# Patient Record
Sex: FEMALE | Race: WHITE | Employment: UNEMPLOYED | URBAN - METROPOLITAN AREA
[De-identification: names, ages, dates, MRNs, and addresses within clinical notes are randomized per-mention and may not be internally consistent; named-entity substitution may affect disease eponyms.]

---

## 2018-01-04 PROBLEM — Z86.73 HISTORY OF CVA (CEREBROVASCULAR ACCIDENT): Status: ACTIVE | Noted: 2018-01-04

## 2018-01-04 PROBLEM — R06.02 SOB (SHORTNESS OF BREATH): Status: ACTIVE | Noted: 2018-01-04

## 2018-01-04 PROBLEM — E11.65 TYPE II OR UNSPECIFIED TYPE DIABETES MELLITUS WITH RENAL MANIFESTATIONS, UNCONTROLLED(250.42) (HCC): Status: ACTIVE | Noted: 2018-01-04

## 2018-01-04 PROBLEM — R94.31 ABNORMAL EKG: Status: ACTIVE | Noted: 2018-01-04

## 2018-01-04 PROBLEM — E11.29 TYPE II OR UNSPECIFIED TYPE DIABETES MELLITUS WITH RENAL MANIFESTATIONS, UNCONTROLLED(250.42) (HCC): Status: ACTIVE | Noted: 2018-01-04

## 2018-01-04 PROBLEM — G40.909 SEIZURE DISORDER (HCC): Status: ACTIVE | Noted: 2018-01-04

## 2018-11-26 PROBLEM — G40.909 SEIZURE DISORDER (HCC): Status: RESOLVED | Noted: 2018-01-04 | Resolved: 2018-11-26

## 2021-12-28 PROBLEM — G30.9 ALZHEIMER'S DISEASE, UNSPECIFIED (CODE) (HCC): Status: ACTIVE | Noted: 2021-12-28

## 2021-12-29 ENCOUNTER — HOME HEALTH ADMISSION (OUTPATIENT)
Dept: HOME HEALTH SERVICES | Facility: HOME HEALTH | Age: 71
End: 2021-12-29

## 2022-01-12 ENCOUNTER — HOME HEALTH ADMISSION (OUTPATIENT)
Dept: HOME HEALTH SERVICES | Facility: HOME HEALTH | Age: 72
End: 2022-01-12
Payer: MEDICARE

## 2022-01-21 ENCOUNTER — HOME CARE VISIT (OUTPATIENT)
Dept: SCHEDULING | Facility: HOME HEALTH | Age: 72
End: 2022-01-21
Payer: MEDICARE

## 2022-01-21 VITALS
WEIGHT: 165 LBS | OXYGEN SATURATION: 98 % | DIASTOLIC BLOOD PRESSURE: 80 MMHG | RESPIRATION RATE: 17 BRPM | TEMPERATURE: 97.5 F | SYSTOLIC BLOOD PRESSURE: 132 MMHG | HEART RATE: 92 BPM | HEIGHT: 62 IN | BODY MASS INDEX: 30.36 KG/M2

## 2022-01-21 PROCEDURE — 400018 HH-NO PAY CLAIM PROCEDURE

## 2022-01-21 PROCEDURE — 400013 HH SOC

## 2022-01-21 PROCEDURE — G0299 HHS/HOSPICE OF RN EA 15 MIN: HCPCS

## 2022-01-21 PROCEDURE — 3331090002 HH PPS REVENUE DEBIT

## 2022-01-21 PROCEDURE — 3331090001 HH PPS REVENUE CREDIT

## 2022-01-22 PROCEDURE — 3331090002 HH PPS REVENUE DEBIT

## 2022-01-22 PROCEDURE — 3331090001 HH PPS REVENUE CREDIT

## 2022-01-23 PROCEDURE — 3331090001 HH PPS REVENUE CREDIT

## 2022-01-23 PROCEDURE — 3331090002 HH PPS REVENUE DEBIT

## 2022-01-24 PROCEDURE — 3331090001 HH PPS REVENUE CREDIT

## 2022-01-24 PROCEDURE — 3331090002 HH PPS REVENUE DEBIT

## 2022-01-25 ENCOUNTER — HOME CARE VISIT (OUTPATIENT)
Dept: SCHEDULING | Facility: HOME HEALTH | Age: 72
End: 2022-01-25
Payer: MEDICARE

## 2022-01-25 VITALS
SYSTOLIC BLOOD PRESSURE: 116 MMHG | DIASTOLIC BLOOD PRESSURE: 72 MMHG | OXYGEN SATURATION: 98 % | TEMPERATURE: 98 F | RESPIRATION RATE: 17 BRPM | HEART RATE: 110 BPM

## 2022-01-25 PROCEDURE — G0299 HHS/HOSPICE OF RN EA 15 MIN: HCPCS

## 2022-01-25 PROCEDURE — 3331090001 HH PPS REVENUE CREDIT

## 2022-01-25 PROCEDURE — 3331090002 HH PPS REVENUE DEBIT

## 2022-01-26 PROCEDURE — 3331090001 HH PPS REVENUE CREDIT

## 2022-01-26 PROCEDURE — 3331090002 HH PPS REVENUE DEBIT

## 2022-01-27 PROCEDURE — 3331090001 HH PPS REVENUE CREDIT

## 2022-01-27 PROCEDURE — 3331090002 HH PPS REVENUE DEBIT

## 2022-01-28 ENCOUNTER — HOME CARE VISIT (OUTPATIENT)
Dept: SCHEDULING | Facility: HOME HEALTH | Age: 72
End: 2022-01-28
Payer: MEDICARE

## 2022-01-28 VITALS
RESPIRATION RATE: 17 BRPM | DIASTOLIC BLOOD PRESSURE: 70 MMHG | OXYGEN SATURATION: 98 % | HEART RATE: 100 BPM | TEMPERATURE: 98.4 F | SYSTOLIC BLOOD PRESSURE: 110 MMHG

## 2022-01-28 PROCEDURE — G0299 HHS/HOSPICE OF RN EA 15 MIN: HCPCS

## 2022-01-28 PROCEDURE — 3331090001 HH PPS REVENUE CREDIT

## 2022-01-28 PROCEDURE — 3331090002 HH PPS REVENUE DEBIT

## 2022-01-29 PROCEDURE — 3331090001 HH PPS REVENUE CREDIT

## 2022-01-29 PROCEDURE — 3331090002 HH PPS REVENUE DEBIT

## 2022-01-30 PROCEDURE — 3331090002 HH PPS REVENUE DEBIT

## 2022-01-30 PROCEDURE — 3331090001 HH PPS REVENUE CREDIT

## 2022-01-31 PROCEDURE — 3331090002 HH PPS REVENUE DEBIT

## 2022-01-31 PROCEDURE — 3331090001 HH PPS REVENUE CREDIT

## 2022-02-01 ENCOUNTER — HOME CARE VISIT (OUTPATIENT)
Dept: SCHEDULING | Facility: HOME HEALTH | Age: 72
End: 2022-02-01
Payer: MEDICARE

## 2022-02-01 VITALS
OXYGEN SATURATION: 98 % | SYSTOLIC BLOOD PRESSURE: 118 MMHG | DIASTOLIC BLOOD PRESSURE: 76 MMHG | HEART RATE: 90 BPM | TEMPERATURE: 98.1 F | RESPIRATION RATE: 17 BRPM

## 2022-02-01 PROCEDURE — G0299 HHS/HOSPICE OF RN EA 15 MIN: HCPCS

## 2022-02-01 PROCEDURE — 3331090002 HH PPS REVENUE DEBIT

## 2022-02-01 PROCEDURE — 3331090001 HH PPS REVENUE CREDIT

## 2022-02-02 ENCOUNTER — HOME CARE VISIT (OUTPATIENT)
Dept: HOME HEALTH SERVICES | Facility: HOME HEALTH | Age: 72
End: 2022-02-02
Payer: MEDICARE

## 2022-02-02 PROCEDURE — 3331090002 HH PPS REVENUE DEBIT

## 2022-02-02 PROCEDURE — 3331090001 HH PPS REVENUE CREDIT

## 2022-02-03 PROCEDURE — 3331090001 HH PPS REVENUE CREDIT

## 2022-02-03 PROCEDURE — 3331090002 HH PPS REVENUE DEBIT

## 2022-02-04 ENCOUNTER — HOME CARE VISIT (OUTPATIENT)
Dept: SCHEDULING | Facility: HOME HEALTH | Age: 72
End: 2022-02-04
Payer: MEDICARE

## 2022-02-04 VITALS
HEART RATE: 88 BPM | OXYGEN SATURATION: 98 % | RESPIRATION RATE: 19 BRPM | SYSTOLIC BLOOD PRESSURE: 122 MMHG | TEMPERATURE: 97.8 F | DIASTOLIC BLOOD PRESSURE: 64 MMHG

## 2022-02-04 PROCEDURE — G0299 HHS/HOSPICE OF RN EA 15 MIN: HCPCS

## 2022-02-04 PROCEDURE — 3331090001 HH PPS REVENUE CREDIT

## 2022-02-04 PROCEDURE — 3331090002 HH PPS REVENUE DEBIT

## 2022-02-05 PROCEDURE — 3331090002 HH PPS REVENUE DEBIT

## 2022-02-05 PROCEDURE — 3331090001 HH PPS REVENUE CREDIT

## 2022-02-06 PROCEDURE — 3331090002 HH PPS REVENUE DEBIT

## 2022-02-06 PROCEDURE — 3331090001 HH PPS REVENUE CREDIT

## 2022-02-07 PROCEDURE — 3331090002 HH PPS REVENUE DEBIT

## 2022-02-07 PROCEDURE — 3331090001 HH PPS REVENUE CREDIT

## 2022-02-08 PROCEDURE — 3331090002 HH PPS REVENUE DEBIT

## 2022-02-08 PROCEDURE — 3331090001 HH PPS REVENUE CREDIT

## 2022-02-09 ENCOUNTER — HOME CARE VISIT (OUTPATIENT)
Dept: SCHEDULING | Facility: HOME HEALTH | Age: 72
End: 2022-02-09
Payer: MEDICARE

## 2022-02-09 VITALS
DIASTOLIC BLOOD PRESSURE: 72 MMHG | TEMPERATURE: 45.3 F | RESPIRATION RATE: 17 BRPM | SYSTOLIC BLOOD PRESSURE: 118 MMHG | OXYGEN SATURATION: 98 %

## 2022-02-09 PROBLEM — G62.9 NEUROPATHY: Status: ACTIVE | Noted: 2022-02-09

## 2022-02-09 PROBLEM — Z79.4 TYPE 2 DIABETES MELLITUS WITH HYPERGLYCEMIA, WITH LONG-TERM CURRENT USE OF INSULIN (HCC): Status: ACTIVE | Noted: 2022-02-09

## 2022-02-09 PROBLEM — E78.00 HYPERCHOLESTEROLEMIA: Status: ACTIVE | Noted: 2022-02-09

## 2022-02-09 PROBLEM — H35.00 RETINOPATHY: Status: ACTIVE | Noted: 2022-02-09

## 2022-02-09 PROBLEM — R41.3 MEMORY LOSS: Status: ACTIVE | Noted: 2022-02-09

## 2022-02-09 PROBLEM — R80.9 MICROALBUMINURIA: Status: ACTIVE | Noted: 2022-02-09

## 2022-02-09 PROBLEM — E11.65 TYPE 2 DIABETES MELLITUS WITH HYPERGLYCEMIA, WITH LONG-TERM CURRENT USE OF INSULIN (HCC): Status: ACTIVE | Noted: 2022-02-09

## 2022-02-09 PROCEDURE — 3331090001 HH PPS REVENUE CREDIT

## 2022-02-09 PROCEDURE — G0299 HHS/HOSPICE OF RN EA 15 MIN: HCPCS

## 2022-02-09 PROCEDURE — 3331090002 HH PPS REVENUE DEBIT

## 2022-02-10 ENCOUNTER — TELEPHONE (OUTPATIENT)
Dept: DIABETES SERVICES | Age: 72
End: 2022-02-10

## 2022-02-10 ENCOUNTER — HOME CARE VISIT (OUTPATIENT)
Dept: SCHEDULING | Facility: HOME HEALTH | Age: 72
End: 2022-02-10
Payer: MEDICARE

## 2022-02-10 VITALS
RESPIRATION RATE: 17 BRPM | OXYGEN SATURATION: 99 % | DIASTOLIC BLOOD PRESSURE: 70 MMHG | TEMPERATURE: 97.7 F | HEART RATE: 74 BPM | SYSTOLIC BLOOD PRESSURE: 124 MMHG

## 2022-02-10 PROCEDURE — 3331090001 HH PPS REVENUE CREDIT

## 2022-02-10 PROCEDURE — 3331090002 HH PPS REVENUE DEBIT

## 2022-02-10 PROCEDURE — G0299 HHS/HOSPICE OF RN EA 15 MIN: HCPCS

## 2022-02-10 NOTE — TELEPHONE ENCOUNTER
Call to patient about Diabetes education. Talked with  Flores Horn. She has never had education on Medicare and is interested. Instructed [place] Medicare pays 80%. He reports she has a secondary. Instructed secondary hopefully will  the rest.  Instructed to bring secondary card with them to appointment as do not have it in her chart. Called [employer]. Scheduled for 3- at 8 AM.  He reports she is not compliant with her diet and she needs the help.

## 2022-02-11 PROCEDURE — 3331090001 HH PPS REVENUE CREDIT

## 2022-02-11 PROCEDURE — 3331090002 HH PPS REVENUE DEBIT

## 2022-02-12 PROCEDURE — 3331090001 HH PPS REVENUE CREDIT

## 2022-02-12 PROCEDURE — 3331090002 HH PPS REVENUE DEBIT

## 2022-02-13 PROCEDURE — 3331090002 HH PPS REVENUE DEBIT

## 2022-02-13 PROCEDURE — 3331090001 HH PPS REVENUE CREDIT

## 2022-02-14 PROCEDURE — 3331090002 HH PPS REVENUE DEBIT

## 2022-02-14 PROCEDURE — 3331090001 HH PPS REVENUE CREDIT

## 2022-02-15 ENCOUNTER — HOME CARE VISIT (OUTPATIENT)
Dept: SCHEDULING | Facility: HOME HEALTH | Age: 72
End: 2022-02-15
Payer: MEDICARE

## 2022-02-15 VITALS
TEMPERATURE: 97.8 F | HEART RATE: 88 BPM | DIASTOLIC BLOOD PRESSURE: 94 MMHG | SYSTOLIC BLOOD PRESSURE: 132 MMHG | RESPIRATION RATE: 17 BRPM | OXYGEN SATURATION: 98 %

## 2022-02-15 PROCEDURE — G0299 HHS/HOSPICE OF RN EA 15 MIN: HCPCS

## 2022-02-15 PROCEDURE — 3331090001 HH PPS REVENUE CREDIT

## 2022-02-15 PROCEDURE — 3331090002 HH PPS REVENUE DEBIT

## 2022-02-16 PROCEDURE — 3331090001 HH PPS REVENUE CREDIT

## 2022-02-16 PROCEDURE — 3331090002 HH PPS REVENUE DEBIT

## 2022-02-17 PROCEDURE — 3331090001 HH PPS REVENUE CREDIT

## 2022-02-17 PROCEDURE — 3331090002 HH PPS REVENUE DEBIT

## 2022-02-18 PROCEDURE — 3331090002 HH PPS REVENUE DEBIT

## 2022-02-18 PROCEDURE — 3331090001 HH PPS REVENUE CREDIT

## 2022-02-19 PROCEDURE — 3331090001 HH PPS REVENUE CREDIT

## 2022-02-19 PROCEDURE — 3331090002 HH PPS REVENUE DEBIT

## 2022-02-20 PROCEDURE — 3331090002 HH PPS REVENUE DEBIT

## 2022-02-20 PROCEDURE — 3331090001 HH PPS REVENUE CREDIT

## 2022-02-21 PROCEDURE — 3331090001 HH PPS REVENUE CREDIT

## 2022-02-21 PROCEDURE — 3331090002 HH PPS REVENUE DEBIT

## 2022-02-22 PROCEDURE — 3331090002 HH PPS REVENUE DEBIT

## 2022-02-22 PROCEDURE — 3331090001 HH PPS REVENUE CREDIT

## 2022-02-23 PROCEDURE — 3331090001 HH PPS REVENUE CREDIT

## 2022-02-23 PROCEDURE — 3331090002 HH PPS REVENUE DEBIT

## 2022-02-24 ENCOUNTER — HOME CARE VISIT (OUTPATIENT)
Dept: SCHEDULING | Facility: HOME HEALTH | Age: 72
End: 2022-02-24
Payer: MEDICARE

## 2022-02-24 VITALS
OXYGEN SATURATION: 98 % | DIASTOLIC BLOOD PRESSURE: 74 MMHG | RESPIRATION RATE: 17 BRPM | SYSTOLIC BLOOD PRESSURE: 126 MMHG | TEMPERATURE: 97.3 F | HEART RATE: 94 BPM

## 2022-02-24 PROCEDURE — 400013 HH SOC

## 2022-02-24 PROCEDURE — 3331090002 HH PPS REVENUE DEBIT

## 2022-02-24 PROCEDURE — 3331090001 HH PPS REVENUE CREDIT

## 2022-02-24 PROCEDURE — G0299 HHS/HOSPICE OF RN EA 15 MIN: HCPCS

## 2022-02-25 PROCEDURE — 3331090001 HH PPS REVENUE CREDIT

## 2022-02-25 PROCEDURE — 3331090002 HH PPS REVENUE DEBIT

## 2022-02-26 PROCEDURE — 3331090001 HH PPS REVENUE CREDIT

## 2022-02-26 PROCEDURE — 3331090002 HH PPS REVENUE DEBIT

## 2022-02-27 PROCEDURE — 3331090002 HH PPS REVENUE DEBIT

## 2022-02-27 PROCEDURE — 3331090001 HH PPS REVENUE CREDIT

## 2022-02-28 PROCEDURE — 3331090002 HH PPS REVENUE DEBIT

## 2022-02-28 PROCEDURE — 3331090001 HH PPS REVENUE CREDIT

## 2022-03-01 ENCOUNTER — HOME CARE VISIT (OUTPATIENT)
Dept: SCHEDULING | Facility: HOME HEALTH | Age: 72
End: 2022-03-01
Payer: MEDICARE

## 2022-03-01 VITALS
OXYGEN SATURATION: 98 % | HEART RATE: 100 BPM | TEMPERATURE: 98.3 F | RESPIRATION RATE: 17 BRPM | SYSTOLIC BLOOD PRESSURE: 128 MMHG | DIASTOLIC BLOOD PRESSURE: 72 MMHG

## 2022-03-01 PROCEDURE — G0299 HHS/HOSPICE OF RN EA 15 MIN: HCPCS

## 2022-03-01 PROCEDURE — 3331090001 HH PPS REVENUE CREDIT

## 2022-03-01 PROCEDURE — 3331090002 HH PPS REVENUE DEBIT

## 2022-03-02 PROCEDURE — 3331090001 HH PPS REVENUE CREDIT

## 2022-03-02 PROCEDURE — 3331090002 HH PPS REVENUE DEBIT

## 2022-03-03 PROCEDURE — 3331090002 HH PPS REVENUE DEBIT

## 2022-03-03 PROCEDURE — 3331090001 HH PPS REVENUE CREDIT

## 2022-03-04 PROCEDURE — 3331090001 HH PPS REVENUE CREDIT

## 2022-03-04 PROCEDURE — 3331090002 HH PPS REVENUE DEBIT

## 2022-03-05 PROCEDURE — 3331090001 HH PPS REVENUE CREDIT

## 2022-03-05 PROCEDURE — 3331090002 HH PPS REVENUE DEBIT

## 2022-03-06 PROCEDURE — 3331090001 HH PPS REVENUE CREDIT

## 2022-03-06 PROCEDURE — 3331090002 HH PPS REVENUE DEBIT

## 2022-03-07 PROCEDURE — 3331090002 HH PPS REVENUE DEBIT

## 2022-03-07 PROCEDURE — 3331090001 HH PPS REVENUE CREDIT

## 2022-03-08 PROCEDURE — 3331090002 HH PPS REVENUE DEBIT

## 2022-03-08 PROCEDURE — 3331090001 HH PPS REVENUE CREDIT

## 2022-03-09 ENCOUNTER — HOME CARE VISIT (OUTPATIENT)
Dept: SCHEDULING | Facility: HOME HEALTH | Age: 72
End: 2022-03-09
Payer: MEDICARE

## 2022-03-09 VITALS
DIASTOLIC BLOOD PRESSURE: 70 MMHG | OXYGEN SATURATION: 99 % | HEART RATE: 98 BPM | SYSTOLIC BLOOD PRESSURE: 126 MMHG | TEMPERATURE: 97.7 F | RESPIRATION RATE: 17 BRPM

## 2022-03-09 PROCEDURE — 3331090002 HH PPS REVENUE DEBIT

## 2022-03-09 PROCEDURE — 3331090001 HH PPS REVENUE CREDIT

## 2022-03-09 PROCEDURE — G0299 HHS/HOSPICE OF RN EA 15 MIN: HCPCS

## 2022-03-10 PROCEDURE — 3331090001 HH PPS REVENUE CREDIT

## 2022-03-10 PROCEDURE — 3331090002 HH PPS REVENUE DEBIT

## 2022-03-11 PROCEDURE — 3331090002 HH PPS REVENUE DEBIT

## 2022-03-11 PROCEDURE — 3331090001 HH PPS REVENUE CREDIT

## 2022-03-12 PROCEDURE — 3331090001 HH PPS REVENUE CREDIT

## 2022-03-12 PROCEDURE — 3331090002 HH PPS REVENUE DEBIT

## 2022-03-13 PROCEDURE — 3331090002 HH PPS REVENUE DEBIT

## 2022-03-13 PROCEDURE — 3331090001 HH PPS REVENUE CREDIT

## 2022-03-14 PROCEDURE — 3331090001 HH PPS REVENUE CREDIT

## 2022-03-14 PROCEDURE — 3331090002 HH PPS REVENUE DEBIT

## 2022-03-15 ENCOUNTER — TELEPHONE (OUTPATIENT)
Dept: DIABETES SERVICES | Age: 72
End: 2022-03-15

## 2022-03-15 PROCEDURE — 3331090001 HH PPS REVENUE CREDIT

## 2022-03-15 PROCEDURE — 3331090002 HH PPS REVENUE DEBIT

## 2022-03-15 NOTE — TELEPHONE ENCOUNTER
Patient no show for Diabetes assessment today. Called and left message to reschedule. Number provided.

## 2022-03-16 PROCEDURE — 3331090002 HH PPS REVENUE DEBIT

## 2022-03-16 PROCEDURE — 3331090001 HH PPS REVENUE CREDIT

## 2022-03-17 ENCOUNTER — HOME CARE VISIT (OUTPATIENT)
Dept: SCHEDULING | Facility: HOME HEALTH | Age: 72
End: 2022-03-17
Payer: MEDICARE

## 2022-03-17 VITALS
TEMPERATURE: 97.7 F | OXYGEN SATURATION: 98 % | SYSTOLIC BLOOD PRESSURE: 126 MMHG | HEART RATE: 86 BPM | RESPIRATION RATE: 17 BRPM | DIASTOLIC BLOOD PRESSURE: 70 MMHG

## 2022-03-17 PROCEDURE — G0299 HHS/HOSPICE OF RN EA 15 MIN: HCPCS

## 2022-03-17 PROCEDURE — 3331090002 HH PPS REVENUE DEBIT

## 2022-03-17 PROCEDURE — 3331090001 HH PPS REVENUE CREDIT

## 2022-03-18 PROBLEM — Z79.4 TYPE 2 DIABETES MELLITUS WITH HYPERGLYCEMIA, WITH LONG-TERM CURRENT USE OF INSULIN (HCC): Status: ACTIVE | Noted: 2022-02-09

## 2022-03-18 PROBLEM — E11.65 TYPE 2 DIABETES MELLITUS WITH HYPERGLYCEMIA, WITH LONG-TERM CURRENT USE OF INSULIN (HCC): Status: ACTIVE | Noted: 2022-02-09

## 2022-03-18 PROBLEM — G62.9 NEUROPATHY: Status: ACTIVE | Noted: 2022-02-09

## 2022-03-18 PROBLEM — R41.3 MEMORY LOSS: Status: ACTIVE | Noted: 2022-02-09

## 2022-03-18 PROBLEM — R80.9 MICROALBUMINURIA: Status: ACTIVE | Noted: 2022-02-09

## 2022-03-18 PROBLEM — H35.00 RETINOPATHY: Status: ACTIVE | Noted: 2022-02-09

## 2022-03-18 PROCEDURE — 3331090001 HH PPS REVENUE CREDIT

## 2022-03-18 PROCEDURE — 3331090002 HH PPS REVENUE DEBIT

## 2022-03-19 PROBLEM — E11.65 TYPE II OR UNSPECIFIED TYPE DIABETES MELLITUS WITH RENAL MANIFESTATIONS, UNCONTROLLED(250.42): Status: ACTIVE | Noted: 2018-01-04

## 2022-03-19 PROBLEM — E11.29 TYPE II OR UNSPECIFIED TYPE DIABETES MELLITUS WITH RENAL MANIFESTATIONS, UNCONTROLLED(250.42): Status: ACTIVE | Noted: 2018-01-04

## 2022-03-19 PROBLEM — R94.31 ABNORMAL EKG: Status: ACTIVE | Noted: 2018-01-04

## 2022-03-19 PROBLEM — Z86.73 HISTORY OF CVA (CEREBROVASCULAR ACCIDENT): Status: ACTIVE | Noted: 2018-01-04

## 2022-03-19 PROBLEM — R06.02 SOB (SHORTNESS OF BREATH): Status: ACTIVE | Noted: 2018-01-04

## 2022-03-19 PROBLEM — E78.00 HYPERCHOLESTEROLEMIA: Status: ACTIVE | Noted: 2022-02-09

## 2022-03-19 PROCEDURE — 3331090001 HH PPS REVENUE CREDIT

## 2022-03-19 PROCEDURE — 3331090002 HH PPS REVENUE DEBIT

## 2022-03-20 PROBLEM — G30.9 ALZHEIMER'S DISEASE, UNSPECIFIED (CODE) (HCC): Status: ACTIVE | Noted: 2021-12-28

## 2022-03-20 PROCEDURE — 3331090002 HH PPS REVENUE DEBIT

## 2022-03-20 PROCEDURE — 3331090001 HH PPS REVENUE CREDIT

## 2022-03-21 PROCEDURE — 3331090002 HH PPS REVENUE DEBIT

## 2022-03-21 PROCEDURE — 3331090001 HH PPS REVENUE CREDIT

## 2022-03-22 PROCEDURE — 3331090002 HH PPS REVENUE DEBIT

## 2022-03-22 PROCEDURE — 3331090001 HH PPS REVENUE CREDIT

## 2022-03-23 PROCEDURE — 3331090002 HH PPS REVENUE DEBIT

## 2022-03-23 PROCEDURE — 3331090001 HH PPS REVENUE CREDIT

## 2022-03-24 ENCOUNTER — HOME CARE VISIT (OUTPATIENT)
Dept: SCHEDULING | Facility: HOME HEALTH | Age: 72
End: 2022-03-24
Payer: MEDICARE

## 2022-03-24 VITALS
SYSTOLIC BLOOD PRESSURE: 128 MMHG | TEMPERATURE: 97.5 F | HEART RATE: 98 BPM | DIASTOLIC BLOOD PRESSURE: 76 MMHG | OXYGEN SATURATION: 99 % | RESPIRATION RATE: 17 BRPM

## 2022-03-24 PROCEDURE — 400014 HH F/U

## 2022-03-24 PROCEDURE — 3331090001 HH PPS REVENUE CREDIT

## 2022-03-24 PROCEDURE — 3331090002 HH PPS REVENUE DEBIT

## 2022-03-24 PROCEDURE — G0299 HHS/HOSPICE OF RN EA 15 MIN: HCPCS

## 2022-03-25 PROCEDURE — 3331090001 HH PPS REVENUE CREDIT

## 2022-03-25 PROCEDURE — 3331090002 HH PPS REVENUE DEBIT

## 2022-03-26 PROCEDURE — 3331090001 HH PPS REVENUE CREDIT

## 2022-03-26 PROCEDURE — 3331090002 HH PPS REVENUE DEBIT

## 2022-03-27 PROCEDURE — 3331090001 HH PPS REVENUE CREDIT

## 2022-03-27 PROCEDURE — 3331090002 HH PPS REVENUE DEBIT

## 2022-03-28 PROCEDURE — 3331090002 HH PPS REVENUE DEBIT

## 2022-03-28 PROCEDURE — 3331090001 HH PPS REVENUE CREDIT

## 2022-03-29 ENCOUNTER — HOME CARE VISIT (OUTPATIENT)
Dept: SCHEDULING | Facility: HOME HEALTH | Age: 72
End: 2022-03-29
Payer: MEDICARE

## 2022-03-29 ENCOUNTER — TELEPHONE (OUTPATIENT)
Dept: DIABETES SERVICES | Age: 72
End: 2022-03-29

## 2022-03-29 VITALS
RESPIRATION RATE: 17 BRPM | TEMPERATURE: 97.4 F | SYSTOLIC BLOOD PRESSURE: 112 MMHG | OXYGEN SATURATION: 98 % | DIASTOLIC BLOOD PRESSURE: 80 MMHG | HEART RATE: 86 BPM

## 2022-03-29 PROCEDURE — G0299 HHS/HOSPICE OF RN EA 15 MIN: HCPCS

## 2022-03-29 PROCEDURE — 3331090001 HH PPS REVENUE CREDIT

## 2022-03-29 PROCEDURE — 3331090002 HH PPS REVENUE DEBIT

## 2022-03-29 RX ADMIN — DULAGLUTIDE 0.75 MG: 0.75 INJECTION, SOLUTION SUBCUTANEOUS at 11:49

## 2022-03-29 NOTE — TELEPHONE ENCOUNTER
Call to patient about rescheduling her assessment that she was no show for on 3-. She reports she was there at ten till 8 AM. And\" she was sent upstairs, downstairs and was all over the place\". She also said she was told that day it was at  9 AM but it was 8 AM. She does not want to reschedule. She says if she decides to do education she will let us know. Closing Diabetes file. Notifying referring.

## 2022-03-30 PROCEDURE — 3331090002 HH PPS REVENUE DEBIT

## 2022-03-30 PROCEDURE — 3331090001 HH PPS REVENUE CREDIT

## 2022-03-31 PROCEDURE — 3331090001 HH PPS REVENUE CREDIT

## 2022-03-31 PROCEDURE — 3331090002 HH PPS REVENUE DEBIT

## 2022-04-01 ENCOUNTER — TELEPHONE (OUTPATIENT)
Dept: DIABETES SERVICES | Age: 72
End: 2022-04-01

## 2022-04-01 PROCEDURE — 3331090001 HH PPS REVENUE CREDIT

## 2022-04-01 PROCEDURE — 3331090002 HH PPS REVENUE DEBIT

## 2022-04-02 PROCEDURE — 3331090001 HH PPS REVENUE CREDIT

## 2022-04-02 PROCEDURE — 3331090002 HH PPS REVENUE DEBIT

## 2022-04-03 PROCEDURE — 3331090001 HH PPS REVENUE CREDIT

## 2022-04-03 PROCEDURE — 3331090002 HH PPS REVENUE DEBIT

## 2022-04-04 PROCEDURE — 3331090001 HH PPS REVENUE CREDIT

## 2022-04-04 PROCEDURE — 3331090002 HH PPS REVENUE DEBIT

## 2022-04-05 PROCEDURE — 3331090002 HH PPS REVENUE DEBIT

## 2022-04-05 PROCEDURE — 3331090001 HH PPS REVENUE CREDIT

## 2022-04-06 PROCEDURE — 3331090002 HH PPS REVENUE DEBIT

## 2022-04-06 PROCEDURE — 3331090001 HH PPS REVENUE CREDIT

## 2022-04-07 ENCOUNTER — HOME CARE VISIT (OUTPATIENT)
Dept: SCHEDULING | Facility: HOME HEALTH | Age: 72
End: 2022-04-07
Payer: MEDICARE

## 2022-04-07 VITALS
DIASTOLIC BLOOD PRESSURE: 76 MMHG | HEART RATE: 100 BPM | SYSTOLIC BLOOD PRESSURE: 118 MMHG | RESPIRATION RATE: 17 BRPM | TEMPERATURE: 97.7 F | OXYGEN SATURATION: 99 %

## 2022-04-07 PROCEDURE — 3331090002 HH PPS REVENUE DEBIT

## 2022-04-07 PROCEDURE — 3331090001 HH PPS REVENUE CREDIT

## 2022-04-07 PROCEDURE — G0299 HHS/HOSPICE OF RN EA 15 MIN: HCPCS

## 2022-04-08 PROCEDURE — 3331090001 HH PPS REVENUE CREDIT

## 2022-04-08 PROCEDURE — 3331090002 HH PPS REVENUE DEBIT

## 2022-04-09 PROCEDURE — 3331090001 HH PPS REVENUE CREDIT

## 2022-04-09 PROCEDURE — 3331090002 HH PPS REVENUE DEBIT

## 2022-04-10 PROCEDURE — 3331090002 HH PPS REVENUE DEBIT

## 2022-04-10 PROCEDURE — 3331090001 HH PPS REVENUE CREDIT

## 2022-04-11 PROCEDURE — 3331090001 HH PPS REVENUE CREDIT

## 2022-04-11 PROCEDURE — 3331090002 HH PPS REVENUE DEBIT

## 2022-04-12 ENCOUNTER — HOME CARE VISIT (OUTPATIENT)
Dept: SCHEDULING | Facility: HOME HEALTH | Age: 72
End: 2022-04-12
Payer: MEDICARE

## 2022-04-12 VITALS
DIASTOLIC BLOOD PRESSURE: 70 MMHG | RESPIRATION RATE: 17 BRPM | OXYGEN SATURATION: 99 % | SYSTOLIC BLOOD PRESSURE: 110 MMHG | HEART RATE: 100 BPM | TEMPERATURE: 96.9 F

## 2022-04-12 PROCEDURE — G0299 HHS/HOSPICE OF RN EA 15 MIN: HCPCS

## 2022-04-12 PROCEDURE — 3331090001 HH PPS REVENUE CREDIT

## 2022-04-12 PROCEDURE — 3331090002 HH PPS REVENUE DEBIT

## 2022-04-25 ENCOUNTER — TELEPHONE (OUTPATIENT)
Dept: DIABETES SERVICES | Age: 72
End: 2022-04-25

## 2022-04-25 NOTE — TELEPHONE ENCOUNTER
Called and talked to  after reopening chart for Diabetes Education. He reports so busy and overtime and barely gets a day of. He does not know if and when he would be able to do education. He just cannot at this time. Says he has to work. He agrees with closing Diabetes file and will let Amanuel Diaz NP know when he gets ready. Patient Sandra Kelly has dementia and have to talk to him. Diabetes File closed. Notified referring.

## 2022-05-13 ENCOUNTER — HOSPITAL ENCOUNTER (OUTPATIENT)
Dept: LAB | Age: 72
Discharge: HOME | End: 2022-05-13
Payer: MEDICARE

## 2022-05-13 DIAGNOSIS — E11.65 TYPE 2 DIABETES MELLITUS WITH HYPERGLYCEMIA, WITH LONG-TERM CURRENT USE OF INSULIN (HCC): ICD-10-CM

## 2022-05-13 DIAGNOSIS — Z79.4 TYPE 2 DIABETES MELLITUS WITH HYPERGLYCEMIA, WITH LONG-TERM CURRENT USE OF INSULIN (HCC): ICD-10-CM

## 2022-05-13 LAB
25(OH)D3 SERPL-MCNC: 26.6 NG/ML (ref 30–100)
ALBUMIN SERPL-MCNC: 3.7 G/DL (ref 3.2–4.6)
ALBUMIN/GLOB SERPL: 0.9 {RATIO} (ref 1.2–3.5)
ALP SERPL-CCNC: 108 U/L (ref 50–136)
ALT SERPL-CCNC: 23 U/L (ref 12–65)
ANION GAP SERPL CALC-SCNC: 4 MMOL/L (ref 7–16)
AST SERPL-CCNC: 12 U/L (ref 15–37)
BILIRUB SERPL-MCNC: 0.4 MG/DL (ref 0.2–1.1)
BUN SERPL-MCNC: 29 MG/DL (ref 8–23)
CALCIUM SERPL-MCNC: 9.1 MG/DL (ref 8.3–10.4)
CHLORIDE SERPL-SCNC: 99 MMOL/L (ref 98–107)
CO2 SERPL-SCNC: 31 MMOL/L (ref 21–32)
CREAT SERPL-MCNC: 0.8 MG/DL (ref 0.6–1)
GLOBULIN SER CALC-MCNC: 3.9 G/DL (ref 2.3–3.5)
GLUCOSE SERPL-MCNC: 299 MG/DL (ref 65–100)
POTASSIUM SERPL-SCNC: 4.1 MMOL/L (ref 3.5–5.1)
PROT SERPL-MCNC: 7.6 G/DL (ref 6.3–8.2)
SODIUM SERPL-SCNC: 134 MMOL/L (ref 136–145)

## 2022-05-13 PROCEDURE — 84681 ASSAY OF C-PEPTIDE: CPT

## 2022-05-13 PROCEDURE — 82306 VITAMIN D 25 HYDROXY: CPT

## 2022-05-13 PROCEDURE — 80053 COMPREHEN METABOLIC PANEL: CPT

## 2022-05-13 PROCEDURE — 86341 ISLET CELL ANTIBODY: CPT

## 2022-05-13 PROCEDURE — 36415 COLL VENOUS BLD VENIPUNCTURE: CPT

## 2022-05-14 LAB — C PEPTIDE SERPL-MCNC: <0.1 NG/ML (ref 1.1–4.4)

## 2022-05-17 LAB — GAD65 AB SER-ACNC: ABNORMAL U/ML (ref 0–5)

## 2022-05-26 DIAGNOSIS — Z79.4 TYPE 2 DIABETES MELLITUS WITH HYPERGLYCEMIA, WITH LONG-TERM CURRENT USE OF INSULIN (HCC): Primary | ICD-10-CM

## 2022-05-26 DIAGNOSIS — E11.65 TYPE 2 DIABETES MELLITUS WITH HYPERGLYCEMIA, WITH LONG-TERM CURRENT USE OF INSULIN (HCC): Primary | ICD-10-CM

## 2022-06-14 RX ORDER — INSULIN GLARGINE 100 [IU]/ML
INJECTION, SOLUTION SUBCUTANEOUS
COMMUNITY
End: 2022-06-15 | Stop reason: SDUPTHER

## 2022-06-14 RX ORDER — INSULIN LISPRO 100 [IU]/ML
INJECTION, SOLUTION INTRAVENOUS; SUBCUTANEOUS
COMMUNITY
End: 2022-06-15 | Stop reason: SDUPTHER

## 2022-06-14 RX ORDER — DULAGLUTIDE 1.5 MG/.5ML
0.5 INJECTION, SOLUTION SUBCUTANEOUS WEEKLY
COMMUNITY
End: 2022-06-15 | Stop reason: SDUPTHER

## 2022-06-15 ENCOUNTER — OFFICE VISIT (OUTPATIENT)
Dept: ENDOCRINOLOGY | Age: 72
End: 2022-06-15
Payer: MEDICARE

## 2022-06-15 VITALS
DIASTOLIC BLOOD PRESSURE: 74 MMHG | TEMPERATURE: 97.6 F | HEIGHT: 65 IN | HEART RATE: 67 BPM | WEIGHT: 170.4 LBS | SYSTOLIC BLOOD PRESSURE: 130 MMHG | OXYGEN SATURATION: 99 % | BODY MASS INDEX: 28.39 KG/M2

## 2022-06-15 DIAGNOSIS — Z79.4 TYPE 2 DIABETES MELLITUS WITH HYPERGLYCEMIA, WITH LONG-TERM CURRENT USE OF INSULIN (HCC): Primary | ICD-10-CM

## 2022-06-15 DIAGNOSIS — E11.65 TYPE 2 DIABETES MELLITUS WITH HYPERGLYCEMIA, WITH LONG-TERM CURRENT USE OF INSULIN (HCC): Primary | ICD-10-CM

## 2022-06-15 DIAGNOSIS — E11.65 TYPE 2 DIABETES MELLITUS WITH HYPERGLYCEMIA, WITH LONG-TERM CURRENT USE OF INSULIN (HCC): ICD-10-CM

## 2022-06-15 DIAGNOSIS — Z79.4 TYPE 2 DIABETES MELLITUS WITH HYPERGLYCEMIA, WITH LONG-TERM CURRENT USE OF INSULIN (HCC): ICD-10-CM

## 2022-06-15 LAB
ALBUMIN SERPL-MCNC: 3.3 G/DL (ref 3.2–4.6)
ALBUMIN/GLOB SERPL: 0.9 {RATIO} (ref 1.2–3.5)
ALP SERPL-CCNC: 96 U/L (ref 50–136)
ALT SERPL-CCNC: 22 U/L (ref 12–65)
ANION GAP SERPL CALC-SCNC: 11 MMOL/L (ref 7–16)
AST SERPL-CCNC: 12 U/L (ref 15–37)
BILIRUB SERPL-MCNC: 0.3 MG/DL (ref 0.2–1.1)
BUN SERPL-MCNC: 18 MG/DL (ref 8–23)
CALCIUM SERPL-MCNC: 8.9 MG/DL (ref 8.3–10.4)
CHLORIDE SERPL-SCNC: 98 MMOL/L (ref 98–107)
CO2 SERPL-SCNC: 28 MMOL/L (ref 21–32)
CREAT SERPL-MCNC: 0.9 MG/DL (ref 0.6–1)
GLOBULIN SER CALC-MCNC: 3.6 G/DL (ref 2.3–3.5)
GLUCOSE SERPL-MCNC: 307 MG/DL (ref 65–100)
HBA1C MFR BLD: 12.2 %
POTASSIUM SERPL-SCNC: 4 MMOL/L (ref 3.5–5.1)
PROT SERPL-MCNC: 6.9 G/DL (ref 6.3–8.2)
SODIUM SERPL-SCNC: 137 MMOL/L (ref 136–145)

## 2022-06-15 PROCEDURE — G8417 CALC BMI ABV UP PARAM F/U: HCPCS | Performed by: DIETITIAN, REGISTERED

## 2022-06-15 PROCEDURE — 95251 CONT GLUC MNTR ANALYSIS I&R: CPT | Performed by: DIETITIAN, REGISTERED

## 2022-06-15 PROCEDURE — 3017F COLORECTAL CA SCREEN DOC REV: CPT | Performed by: DIETITIAN, REGISTERED

## 2022-06-15 PROCEDURE — 1036F TOBACCO NON-USER: CPT | Performed by: DIETITIAN, REGISTERED

## 2022-06-15 PROCEDURE — 3046F HEMOGLOBIN A1C LEVEL >9.0%: CPT | Performed by: DIETITIAN, REGISTERED

## 2022-06-15 PROCEDURE — 2022F DILAT RTA XM EVC RTNOPTHY: CPT | Performed by: DIETITIAN, REGISTERED

## 2022-06-15 PROCEDURE — G8400 PT W/DXA NO RESULTS DOC: HCPCS | Performed by: DIETITIAN, REGISTERED

## 2022-06-15 PROCEDURE — G8427 DOCREV CUR MEDS BY ELIG CLIN: HCPCS | Performed by: DIETITIAN, REGISTERED

## 2022-06-15 PROCEDURE — 99215 OFFICE O/P EST HI 40 MIN: CPT | Performed by: DIETITIAN, REGISTERED

## 2022-06-15 PROCEDURE — 1123F ACP DISCUSS/DSCN MKR DOCD: CPT | Performed by: DIETITIAN, REGISTERED

## 2022-06-15 PROCEDURE — 83036 HEMOGLOBIN GLYCOSYLATED A1C: CPT | Performed by: DIETITIAN, REGISTERED

## 2022-06-15 PROCEDURE — 1090F PRES/ABSN URINE INCON ASSESS: CPT | Performed by: DIETITIAN, REGISTERED

## 2022-06-15 RX ORDER — INSULIN LISPRO 100 [IU]/ML
INJECTION, SOLUTION INTRAVENOUS; SUBCUTANEOUS
Qty: 39 ML | Refills: 11
Start: 2022-06-15 | End: 2022-07-05 | Stop reason: SDUPTHER

## 2022-06-15 RX ORDER — INSULIN GLARGINE 100 [IU]/ML
INJECTION, SOLUTION SUBCUTANEOUS
Qty: 54 ML | Refills: 11
Start: 2022-06-15 | End: 2022-07-05 | Stop reason: ALTCHOICE

## 2022-06-15 RX ORDER — DULAGLUTIDE 1.5 MG/.5ML
1.5 INJECTION, SOLUTION SUBCUTANEOUS WEEKLY
Qty: 2 ML | Refills: 3 | Status: SHIPPED | OUTPATIENT
Start: 2022-06-15 | End: 2022-07-05 | Stop reason: SDUPTHER

## 2022-06-15 RX ORDER — METFORMIN HYDROCHLORIDE 500 MG/1
1000 TABLET, EXTENDED RELEASE ORAL 2 TIMES DAILY WITH MEALS
Qty: 360 TABLET | Refills: 11
Start: 2022-06-15 | End: 2022-07-05 | Stop reason: ALTCHOICE

## 2022-06-15 ASSESSMENT — ENCOUNTER SYMPTOMS
VOMITING: 0
WHEEZING: 0
NAUSEA: 0
VOICE CHANGE: 0
BACK PAIN: 0
CONSTIPATION: 0
TROUBLE SWALLOWING: 0
DIARRHEA: 0
SHORTNESS OF BREATH: 0
COUGH: 0
ABDOMINAL PAIN: 0

## 2022-06-15 NOTE — PROGRESS NOTES
[facility] ENDOCRINOLOGY   AND   THYROID NODULE CLINIC    VY Garza  [address], [address] [facility], [address] [place] [address]  Phone [phone]  Facsimile [phone]      Reason for visit: Renay NGUYEN (1950) is here for follow up of Type 2 Diabetes Mellitus. ASSESSMENT AND PLAN:    Interpretation of 72 hour glucose monitor: At least 72 hours of data were reviewed. The patient utilizes a Dexcom G6 continuous glucose monitoring system. The average glucose during the reviewed timeframe was 355 with a standard deviation of 73 (time in range 4.2%, hyperglycemia 183%, hypoglycemia 0%). There is a pattern of constant hyperglycemia. 1. Type 2 diabetes mellitus with hyperglycemia, with long-term current use of insulin ([facility])  A1c is 12.2%. Glycemic control is suboptimal and worsening. Patient has memory loss. Caregiver reports he has been administering patient's insulin and her arm close to her muscle. Instructed patient to administer insulin at the back of the arm where the fat is located. Patient will not allow caregiver to administer insulin in the abdomen. Caregiver reports he was unable to  Trulicity for the past 2 weeks at the pharmacy. Caregiver reports he is unable to administer Humalog to the patient 3 times daily due to patient eating all day long and not being sure how much she will eat for each meal.  Patient would benefit from an insulin pump. However, due to memory loss patient may not qualify through Medicare. Plan to switch patient from Lantus to Humulin  for reduction of daily injections and more potent glycemic reduction. --- Restart Trulicity 1.5 mg weekly. --- Increase Lantus 50 units BID.  Cg chose Lantus over [place] due to cost. However, Tresiba - u100 sample provided due to patient will run out of insulin in 1 week prior to switching to Humulin Ru500.  --- Humalog 3 units AC 3 times daily with correction of 3 for 50 above 150    - AMB POC HEMOGLOBIN A1C; Future  - Comprehensive Metabolic Panel; Future  - HUMALOG KWIKPEN 100 UNIT/ML SOPN; Take 3 units for every 50 points greater than 150 mg/dL. Take with first bite of food. Max daily dose: 40 units  Dispense: 39 mL; Refill: 11  - LANTUS SOLOSTAR 100 UNIT/ML injection pen; 50 units ACB/HS. Titrate by 2 units every 3 days to fasting BG is at goal of  mg/dL. Max Daily Dose: 60 units  Dispense: 54 mL; Refill: 11  - metFORMIN (GLUCOPHAGE-XR) 500 MG extended release tablet; Take 2 tablets by mouth 2 times daily (with meals) Take 2 tablets in the morning and 2 tablets in the evening with food. E11.65. Dispense: 360 tablet; Refill: 11  - TRULICITY 1.5 CB/4.6CW SOPN; Inject 1.5 mg into the skin once a week  Dispense: 2 mL; Refill: 3  - AMB POC HEMOGLOBIN A1C  - GLUCOSE MONITOR, 72 HOUR, PHYS INTERP    2. Memory loss   Unable to administer medications. Aricept 5 mg daily.       3. Hypercholesterolemia   Last UUU 90 ZHWVW LERS ZY less than 70 on simivastatin 40 mg. Instructed cg to administer at bedtime.       4. Essential hypertension   BP controlled on lisinopril-hctz 20-12.5 mg daily. BP Readings from Last 3 Encounters:   06/15/22 130/74   05/10/22 122/76   04/12/22 110/70        5. Primary hypothyroidism   Euthyroid on levothyroxine 125 mcg daily.       6. Microalbuminuria   Instructed on the dangers of high A1c greater than 7% associated with increased health complications including blindness, heart attack, stroke, end-stage renal disease, nerve damage to feet and hands, and increased risk for foot fractures and falls.  Instructed  on the risk for high cost of healthcare due to preventable organ damage. Good candidate for SGLT-2 once sugars normalize for renal protection. History of urinary incontinence.       7. Neuropathy   BLE - right foot more severe than left. Instructed on appropriate foot wear and daily foot care.       8.  History of CVA (cerebrovascular accident)   Will benefit from GLP-1 RA and SGLT-2 Inhibitor - cardioprotective       9. Retinopathy   Eye exam up to date - visits every 3 months for injections. Follow-up and Dispositions    · Return phone call in 1 week. Tests or Results Reviewed: (see lab dates below in note) HgbA1C, Cr, GFR, Microalbumin/Cr ratio, Lipid Profile,  TSH.        History of Present Illness:         Current Diabetes Medications: lantus 40 units BID, humalog 12 units AC TID, metformin 500 mg 2 in the morning and evening, Humalog 15 units every meal - unable to administer regularly  Due to patient eating snacks all day at different times.       History of Present Illness:       History given by patient and her  nani.       No falls in past year.       No history of pancreatitis, DKA, DFW, gastroparesis.       Date of DM diagnosis: age 46, year 1989, History of GDM     Medication Failures: none       Current symptoms: See Review of Systems       Neuropathy: none       Nephropathy: Stage 2 Kidney Disease   12/28/2021      Cr 0.82, GFR 73, microalbumin/Cr ratio 35   03/31/2022 Cr 0.69, GFR 88, microalbumin/Cr ratio none  05/13/2022 Cr 0.80, GFR >60     Retinopathy: Last eye exam was 11/2021 and demonstrated diabetic retinopathy in both eyes.  Eye care specialist is Dr. Ana Arzate.       Diet:    Grazes - cookies and cakes. [employer] food.     Diet cokes, bottled water.       Exercise:  none       Diabetes education: The patient has not received formal diabetes education.       Hemoglobin A1c:   01/14/2018      12.8%   11/26/2018      13.6%   02/08/2021      13.9%   12/28/2021      >15.5%   03/18/2022          12.2%    C-Peptide:  05/13/2022 <0.1     WENDY-65:  05/13/2022 14,258.3 (positive)    Vitamin D:  05/13/2022 26.6       Home blood glucose monitoring frequency:1-2 times per day       Blood glucose: by glucometer               fasting 150-250               AC lunch 328-488               AC supper none               bedtime 287        Hypoglycemia: 5 years ago - EMS called - unable to wake patient who was unconscious. Lowest 40s       Pregnancy: no plans - unable to give birth       Lipids:    02/08/2021  TC- 164, LDL- 82, VLDL- 35,  HDL- 47, TG- 209       Thyroid:                          Lab Results         Component  Value  Date/Time             TSH  0.671  12/28/2021 02:03 PM        TSH  0.099 (L)  02/08/2021 09:35 AM        TSH  7.570 (H)  11/26/2018 11:05 AM        TSH  2.20  01/04/2018 11:25 AM         Other Labs:       Vitamin B12:   12/28/2021      898 (232-1245)       BP:                 BP Readings from Last 3 Encounters:        02/09/22  120/76     02/04/22  122/64     02/01/22  118/76             Weight Trends:                 Wt Readings from Last 3 Encounters:        02/09/22  164 lb (74.4 kg)     01/21/22  165 lb (74.8 kg)     01/12/22  165 lb (74.8 kg)             Medical/Surgical/Social/Family History: Reviewed in Chart       Medications: Reviewed in chart       Allergies   Patient has no known allergies. /74   Pulse 67   Temp 97.6 °F (36.4 °C) (Tympanic)   Ht 5' 5\" (1.651 m)   Wt 170 lb 6.4 oz (77.3 kg)   SpO2 99%   BMI 28.36 kg/m²     Weight Trends: Wt Readings from Last 3 Encounters:   06/15/22 170 lb 6.4 oz (77.3 kg)   05/10/22 171 lb (77.6 kg)   03/18/22 172 lb (78 kg)       Allergies and Medications: Reviewed in Chart    Review of Systems   Constitutional: Negative for appetite change, diaphoresis, fatigue and unexpected weight change. HENT: Negative for trouble swallowing and voice change. Eyes: Negative for visual disturbance. Respiratory: Negative for cough, shortness of breath and wheezing. Cardiovascular: Negative for chest pain, palpitations and leg swelling. Gastrointestinal: Negative for abdominal pain, constipation, diarrhea, nausea and vomiting. Endocrine: Negative for cold intolerance, heat intolerance, polydipsia, polyphagia and polyuria.    Genitourinary: Negative for difficulty urinating and frequency. Musculoskeletal: Negative for arthralgias, back pain and myalgias. Skin: Negative for pallor. Neurological: Negative for dizziness, tremors, weakness, numbness and headaches. Hematological: Negative for adenopathy. Psychiatric/Behavioral: Negative for dysphoric mood and sleep disturbance. The patient is not nervous/anxious. Physical Exam  Constitutional:       General: She is not in acute distress. Appearance: Normal appearance. She is normal weight. She is not ill-appearing. HENT:      Head: Normocephalic. Cardiovascular:      Rate and Rhythm: Normal rate and regular rhythm. Pulses: Normal pulses. Pulmonary:      Effort: No respiratory distress. Breath sounds: Normal breath sounds. No wheezing or rhonchi. Chest:      Chest wall: No tenderness. Abdominal:      General: There is no distension. Palpations: Abdomen is soft. Tenderness: There is no abdominal tenderness. There is no guarding. Musculoskeletal:         General: No swelling, tenderness or signs of injury. Cervical back: Neck supple. No tenderness. Right lower leg: No edema. Left lower leg: No edema. Feet:      Right foot:      Skin integrity: Skin integrity normal. No ulcer. Left foot:      Skin integrity: Skin integrity normal. No ulcer. Lymphadenopathy:      Cervical: No cervical adenopathy. Skin:     General: Skin is warm and dry. Findings: No erythema or rash. Neurological:      Mental Status: She is alert. Motor: No weakness.    Psychiatric:         Mood and Affect: Mood normal.         Behavior: Behavior normal.         Orders Placed This Encounter   Procedures    GLUCOSE MONITOR, 72 HOUR, PHYS INTERP    Comprehensive Metabolic Panel     Standing Status:   Future     Number of Occurrences:   1     Standing Expiration Date:   6/15/2023    AMB POC HEMOGLOBIN A1C     Standing Status:   Future     Standing Expiration Date:   6/15/2023    AMB POC HEMOGLOBIN A1C       Current Outpatient Medications   Medication Sig Dispense Refill    HUMALOG KWIKPEN 100 UNIT/ML SOPN Take 3 units for every 50 points greater than 150 mg/dL. Take with first bite of food. Max daily dose: 40 units 39 mL 11    LANTUS SOLOSTAR 100 UNIT/ML injection pen 50 units ACB/HS. Titrate by 2 units every 3 days to fasting BG is at goal of  mg/dL. Max Daily Dose: 60 units 54 mL 11    metFORMIN (GLUCOPHAGE-XR) 500 MG extended release tablet Take 2 tablets by mouth 2 times daily (with meals) Take 2 tablets in the morning and 2 tablets in the evening with food. E11.65. 360 tablet 11    TRULICITY 1.5 VH/1.3TO SOPN Inject 1.5 mg into the skin once a week 2 mL 3    aspirin 81 MG EC tablet The details of the medication are not available because there are pending changes by a home health clinician.  donepezil (ARICEPT) 5 MG tablet Take 5 mg by mouth      glipiZIDE (GLUCOTROL) 10 MG tablet TAKE 1 TABLET TWICE A DAY      Glucagon (GVOKE HYPOPEN 2-PACK) 1 MG/0.2ML SOAJ Inject 1 mg into the skin as needed      levothyroxine (SYNTHROID) 125 MCG tablet Take 125 mcg by mouth daily      lisinopril-hydroCHLOROthiazide (PRINZIDE;ZESTORETIC) 20-12.5 MG per tablet Take 1 tablet by mouth daily      metoprolol succinate (TOPROL XL) 50 MG extended release tablet Take 50 mg by mouth daily      simvastatin (ZOCOR) 40 MG tablet Take 40 mg by mouth       No current facility-administered medications for this visit. TARUN Zarco NP    On this date 6/15/2022 I have spent 45 minutes reviewing previous notes, test results and face to face with the patient discussing the diagnosis and importance of compliance with the treatment plan as well as documenting on the day of the visit. Portions of this note were generated with the assistance of voice recognition software. As such, some errors in transcription may be present.

## 2022-07-05 ENCOUNTER — SCHEDULED TELEPHONE ENCOUNTER (OUTPATIENT)
Dept: ENDOCRINOLOGY | Age: 72
End: 2022-07-05
Payer: MEDICARE

## 2022-07-05 DIAGNOSIS — E11.65 TYPE 2 DIABETES MELLITUS WITH HYPERGLYCEMIA, WITH LONG-TERM CURRENT USE OF INSULIN (HCC): ICD-10-CM

## 2022-07-05 DIAGNOSIS — Z79.4 TYPE 2 DIABETES MELLITUS WITH HYPERGLYCEMIA, WITH LONG-TERM CURRENT USE OF INSULIN (HCC): ICD-10-CM

## 2022-07-05 DIAGNOSIS — E13.9 LATENT AUTOIMMUNE DIABETES OF ADULTHOOD (LADA), MANAGED AS TYPE 2 (HCC): Primary | ICD-10-CM

## 2022-07-05 PROCEDURE — 99442 PR PHYS/QHP TELEPHONE EVALUATION 11-20 MIN: CPT | Performed by: DIETITIAN, REGISTERED

## 2022-07-05 RX ORDER — DULAGLUTIDE 1.5 MG/.5ML
1.5 INJECTION, SOLUTION SUBCUTANEOUS WEEKLY
Qty: 2 ML | Refills: 3
Start: 2022-07-05 | End: 2022-07-06 | Stop reason: SDUPTHER

## 2022-07-05 RX ORDER — INSULIN LISPRO 100 [IU]/ML
INJECTION, SOLUTION INTRAVENOUS; SUBCUTANEOUS
Qty: 39 ML | Refills: 11
Start: 2022-07-05 | End: 2022-07-06 | Stop reason: SDUPTHER

## 2022-07-05 RX ORDER — INSULIN DEGLUDEC 200 U/ML
80 INJECTION, SOLUTION SUBCUTANEOUS EVERY MORNING
Qty: 18 ML | Refills: 3 | Status: SHIPPED | OUTPATIENT
Start: 2022-07-05 | End: 2022-07-08

## 2022-07-06 ENCOUNTER — TELEPHONE (OUTPATIENT)
Dept: ENDOCRINOLOGY | Age: 72
End: 2022-07-06

## 2022-07-06 DIAGNOSIS — E13.9 LATENT AUTOIMMUNE DIABETES OF ADULTHOOD (LADA), MANAGED AS TYPE 2 (HCC): ICD-10-CM

## 2022-07-06 DIAGNOSIS — Z79.4 TYPE 2 DIABETES MELLITUS WITH HYPERGLYCEMIA, WITH LONG-TERM CURRENT USE OF INSULIN (HCC): ICD-10-CM

## 2022-07-06 DIAGNOSIS — E11.65 TYPE 2 DIABETES MELLITUS WITH HYPERGLYCEMIA, WITH LONG-TERM CURRENT USE OF INSULIN (HCC): ICD-10-CM

## 2022-07-06 RX ORDER — DULAGLUTIDE 1.5 MG/.5ML
1.5 INJECTION, SOLUTION SUBCUTANEOUS WEEKLY
Qty: 2 ML | Refills: 3 | Status: SHIPPED | OUTPATIENT
Start: 2022-07-06 | End: 2022-08-11 | Stop reason: SDUPTHER

## 2022-07-06 RX ORDER — INSULIN LISPRO 100 [IU]/ML
INJECTION, SOLUTION INTRAVENOUS; SUBCUTANEOUS
Qty: 39 ML | Refills: 11 | Status: SHIPPED | OUTPATIENT
Start: 2022-07-06 | End: 2022-08-11 | Stop reason: SDUPTHER

## 2022-07-06 RX ORDER — INSULIN GLARGINE 300 U/ML
80 INJECTION, SOLUTION SUBCUTANEOUS EVERY MORNING
Qty: 30 ML | Refills: 11 | Status: SHIPPED | OUTPATIENT
Start: 2022-07-06 | End: 2022-07-08

## 2022-07-06 NOTE — TELEPHONE ENCOUNTER
Tiago Yu (pt's ) LVM stating pt's medications from her recent visit where suppose to be sent to [facility] stated they do not have Rx's. Pt's Rx's where marked as No Print. Prescriptions for:  [place]  Trulicity   Humalog    Has been sent in.

## 2022-07-06 NOTE — TELEPHONE ENCOUNTER
When trying to send [place], insurance sent a message stating their alternatives are:    Toujeo Solostar  Toujeo max  Lantus    Pt has had lantus in the past, but pt must try at least 2 alternatives before a PA will be approved.

## 2022-07-06 NOTE — TELEPHONE ENCOUNTER
1. Latent autoimmune diabetes of adulthood (ELLIOTT), [facility] as type 2 ([facility]  [facility] - insurance denied [facility] [place]. - TRULICITY 1.5 AQ/5.3MF SOPN; Inject 1.5 mg into the skin once a week  Dispense: 2 mL; Refill: 3  - HUMALOG KWIKPEN 100 UNIT/ML SOPN; Take 3 units for every 50 points greater than 150 mg/dL. Take with first bite of food. Max daily dose: 40 units  Dispense: 39 mL; Refill: 11  - TOUJEO MAX SOLOSTAR 300 UNIT/ML SOPN; Inject 80 Units into the skin every morning Max Daily Dose: 100 units. Dispense: 30 mL; Refill: 11    2. Type 2 diabetes mellitus with hyperglycemia, with long-term current use of insulin ([facility])    - TRULICITY 1.5 OL/5.4CT SOPN; Inject 1.5 mg into the skin once a week  Dispense: 2 mL; Refill: 3  - HUMALOG KWIKPEN 100 UNIT/ML SOPN; Take 3 units for every 50 points greater than 150 mg/dL. Take with first bite of food. Max daily dose: 40 units  Dispense: 39 mL; Refill: 11  - TOUJEO MAX SOLOSTAR 300 UNIT/ML SOPN; Inject 80 Units into the skin every morning Max Daily Dose: 100 units.   Dispense: 30 mL; Refill: 11  ,ed

## 2022-07-07 NOTE — TELEPHONE ENCOUNTER
Pt contacted-Spoke to Mariah Berg (Pt ) advised him of new medication called in and side effects. Pt  verbalized understanding.

## 2022-07-08 ENCOUNTER — TELEPHONE (OUTPATIENT)
Dept: ENDOCRINOLOGY | Age: 72
End: 2022-07-08

## 2022-07-08 DIAGNOSIS — E13.9 LATENT AUTOIMMUNE DIABETES OF ADULTHOOD (LADA), MANAGED AS TYPE 2 (HCC): Primary | ICD-10-CM

## 2022-07-08 RX ORDER — INSULIN GLARGINE 100 [IU]/ML
40 INJECTION, SOLUTION SUBCUTANEOUS NIGHTLY
Qty: 30 ML | Refills: 5 | Status: SHIPPED | OUTPATIENT
Start: 2022-07-08 | End: 2022-07-13 | Stop reason: SDUPTHER

## 2022-07-08 NOTE — TELEPHONE ENCOUNTER
1. Latent autoimmune diabetes of adulthood (ELLIOTT), managed as type 2 ([facility])    - BASAGLAR KWIKPEN 100 UNIT/ML injection pen; Inject 40 Units into the skin nightly Titrate by 2 units every 3 days to fasting BG goal of 120-130 mg/dL. Max dose: 100 units.   Dispense: 30 mL; Refill: 5

## 2022-07-08 NOTE — TELEPHONE ENCOUNTER
If neither are an option lantus can be continued or Basaglar can be prescribed. After speaking to a representative at [facility] (pt's Insurance plan)     ALISE Monge ran the cost for     Toujeo-636.38 w/ ins, 3,000 w/o insurance. Basaglar-Tier 3-PA required-%25 out of pocket pay    Lantus-Now a tier 3-PA required. The best option would be the [address] [place] [address] with the Co-pay card. That way insurance is not used and it will only cost $35 out of pocket for pt.

## 2022-07-11 NOTE — TELEPHONE ENCOUNTER
Pt  has been called and made aware of the new medication, Pt  anca was also advised of the coupon card faxed to his pharmacy regarding the [address] and it should not cost more than $35 and to call me if it. Malik Catalan verbalized understanding.

## 2022-07-12 ENCOUNTER — TELEPHONE (OUTPATIENT)
Dept: ENDOCRINOLOGY | Age: 72
End: 2022-07-12

## 2022-07-12 DIAGNOSIS — E13.9 LATENT AUTOIMMUNE DIABETES OF ADULTHOOD (LADA), MANAGED AS TYPE 2 (HCC): Primary | ICD-10-CM

## 2022-07-12 NOTE — TELEPHONE ENCOUNTER
Pt  called and stated he has received the Basaglar insulin, But the instructions say for pt to take this medication at night, Pt  stated that is the time her sugar drops. Pt  stated pt sugar has dropped so low at one point he had to use the glucagon kit. Pt  wants to know if he can give her insulin at another time that is not around her bedtime. I advised him that pt taking the Basaglar at night will help with her sugars in the morning. Pt  was also advised pt could eat a snack (peanut butter crackers, 4-8 oz of juice) before bed. Pt  stated pt's sugars still drop and he is concerned about giving her insulin at night.

## 2022-07-13 RX ORDER — INSULIN GLARGINE 100 [IU]/ML
64 INJECTION, SOLUTION SUBCUTANEOUS EVERY MORNING
Qty: 69 ML | Refills: 5 | Status: SHIPPED | OUTPATIENT
Start: 2022-07-13 | End: 2022-08-11 | Stop reason: ALTCHOICE

## 2022-08-11 ENCOUNTER — OFFICE VISIT (OUTPATIENT)
Dept: ENDOCRINOLOGY | Age: 72
End: 2022-08-11
Payer: MEDICARE

## 2022-08-11 VITALS
BODY MASS INDEX: 28.99 KG/M2 | HEART RATE: 70 BPM | OXYGEN SATURATION: 98 % | DIASTOLIC BLOOD PRESSURE: 72 MMHG | TEMPERATURE: 98 F | SYSTOLIC BLOOD PRESSURE: 116 MMHG | WEIGHT: 174 LBS | HEIGHT: 65 IN

## 2022-08-11 DIAGNOSIS — E11.319 DIABETIC RETINOPATHY OF BOTH EYES ASSOCIATED WITH TYPE 2 DIABETES MELLITUS, MACULAR EDEMA PRESENCE UNSPECIFIED, UNSPECIFIED RETINOPATHY SEVERITY (HCC): ICD-10-CM

## 2022-08-11 DIAGNOSIS — E13.9 LADA (LATENT AUTOIMMUNE DIABETES IN ADULTS), MANAGED AS TYPE 2 (HCC): ICD-10-CM

## 2022-08-11 DIAGNOSIS — R41.3 MEMORY IMPAIRMENT: ICD-10-CM

## 2022-08-11 PROCEDURE — G8427 DOCREV CUR MEDS BY ELIG CLIN: HCPCS | Performed by: DIETITIAN, REGISTERED

## 2022-08-11 PROCEDURE — 1090F PRES/ABSN URINE INCON ASSESS: CPT | Performed by: DIETITIAN, REGISTERED

## 2022-08-11 PROCEDURE — 95251 CONT GLUC MNTR ANALYSIS I&R: CPT | Performed by: DIETITIAN, REGISTERED

## 2022-08-11 PROCEDURE — G8417 CALC BMI ABV UP PARAM F/U: HCPCS | Performed by: DIETITIAN, REGISTERED

## 2022-08-11 PROCEDURE — G8400 PT W/DXA NO RESULTS DOC: HCPCS | Performed by: DIETITIAN, REGISTERED

## 2022-08-11 PROCEDURE — 1123F ACP DISCUSS/DSCN MKR DOCD: CPT | Performed by: DIETITIAN, REGISTERED

## 2022-08-11 PROCEDURE — 3046F HEMOGLOBIN A1C LEVEL >9.0%: CPT | Performed by: DIETITIAN, REGISTERED

## 2022-08-11 PROCEDURE — 3017F COLORECTAL CA SCREEN DOC REV: CPT | Performed by: DIETITIAN, REGISTERED

## 2022-08-11 PROCEDURE — 1036F TOBACCO NON-USER: CPT | Performed by: DIETITIAN, REGISTERED

## 2022-08-11 PROCEDURE — 99215 OFFICE O/P EST HI 40 MIN: CPT | Performed by: DIETITIAN, REGISTERED

## 2022-08-11 PROCEDURE — 2022F DILAT RTA XM EVC RTNOPTHY: CPT | Performed by: DIETITIAN, REGISTERED

## 2022-08-11 RX ORDER — INSULIN LISPRO 100 [IU]/ML
15 INJECTION, SOLUTION INTRAVENOUS; SUBCUTANEOUS
Qty: 30 ML | Refills: 11 | Status: SHIPPED | OUTPATIENT
Start: 2022-08-11 | End: 2022-10-14 | Stop reason: SDUPTHER

## 2022-08-11 RX ORDER — INSULIN GLARGINE 300 U/ML
84 INJECTION, SOLUTION SUBCUTANEOUS EVERY MORNING
Qty: 39 ML | Refills: 11 | Status: SHIPPED | OUTPATIENT
Start: 2022-08-11 | End: 2022-10-14 | Stop reason: SDUPTHER

## 2022-08-11 RX ORDER — DULAGLUTIDE 1.5 MG/.5ML
1.5 INJECTION, SOLUTION SUBCUTANEOUS WEEKLY
Qty: 2 ML | Refills: 3 | Status: SHIPPED | OUTPATIENT
Start: 2022-08-11 | End: 2022-10-14 | Stop reason: ALTCHOICE

## 2022-08-11 ASSESSMENT — ENCOUNTER SYMPTOMS
VOICE CHANGE: 0
DIARRHEA: 0
SHORTNESS OF BREATH: 0
WHEEZING: 0
BACK PAIN: 0
VOMITING: 0
CONSTIPATION: 0
NAUSEA: 0
COUGH: 0
ABDOMINAL PAIN: 0
TROUBLE SWALLOWING: 0

## 2022-08-11 NOTE — PROGRESS NOTES
[facility] ENDOCRINOLOGY   AND   THYROID NODULE CLINIC    VY Tripp  [address], [address] [facility], [address] [place] [address]  Phone [phone]  Facsimile [phone]      Reason for visit: Axel NGUYEN (1950) is here for follow up of Type 2 Diabetes Mellitus. ASSESSMENT AND PLAN:    Interpretation of 72 hour glucose monitor: At least 72 hours of data were reviewed. The patient utilizes a Dexcom G6 continuous glucose monitoring system. The average glucose during the reviewed timeframe was 353 with a standard deviation of 72 (time in range 3%, hyperglycemia 97%, hypoglycemia 0%). There is a pattern of constant hyperglycemia. 1. 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 Glycemic control is suboptimal.  Patient has memory impairment and is unable to report her food intake as well as how insulin is being dosed. Patient's primary caregiver is unable to administer insulin in correct locations on her body. Today it was noted for a second time that the insulin was being administered in the top of the left arm near the muscle and bone, despite previous visit where we instructed caregiver to administer insulin in the back of the arm where there is subcutaneous tissue. Caregiver has reported difficulty with administering Humalog at the start of the meal due to his work schedule. Primary caregiver is unable to be at home while patient eats in order to administer insulin. Primary caregiver reports patient eats all day long what ever she can get her hands on and drink milk all day long. NP has recommended assisted living in the past.  That is not an option for caregiver, reportedly due to cost.  Caregiver reports patient is tolerating Trulicity without side effects. Caregiver reports Lantus is not effective. However this could be because caregiver may not be injecting Lantus appropriately.   Caregiver previously unable to attend diabetes education. For second time I am referring patient to diabetes education for one-on-one instruction regarding how to inject insulin in the appropriate location for maximal absorption. We will also switch basal insulins from Lantus to Toujeo due to caregivers concerned that insulin is not effective. --- Referred to diabetes education for further instruction on how to dose insulin. Instructed caregiver to bring insulin to visit with CDE and demonstrate how he is dosing insulin. --- Switch from Lantus to Toujeo and increase to 84 units every morning. --- Increase Humalog to 15 units AC 3 times daily with correction 3 for 50 above 150.  --- Continue Trulicity 1.5 mg every week. --- Follow-up in 1 month    - [facility] Diabetic Treatment  - HUMALOG KWIKPEN 100 UNIT/ML SOPN; Inject 15 Units into the skin in the morning and 15 Units at noon and 15 Units in the evening. Inject before meals. Take 3 units for every 50 points greater than 150 mg/dL. Take with first bite of food. Max daily dose: 100 units. Dispense: 30 mL; Refill: 11  - TOUJEO MAX SOLOSTAR 300 UNIT/ML SOPN; Inject 84 Units into the skin every morning Titrate by 2 units every 3 days to fasting BG goal of  mg/dL. Max Daily Dose: 120 units  Dispense: 39 mL; Refill: 11  - TRULICITY 1.5 PY/8.0BA SOPN; Inject 1.5 mg into the skin once a week  Dispense: 2 mL; Refill: 3  - Comprehensive Metabolic Panel; Future  - Lipid Panel; Future  - GLUCOSE MONITOR, 72 HOUR, PHYS INTERP    2. Memory impairment   Unable to administer medications. Aricept 5 mg daily. --- referral to diabetes education 1:1 training on insulin administration. 3. Hypercholesterolemia  Last LDL 82 above goal of less than 70 on simivastatin 40 mg. Instructed cg to administer at bedtime. 4. Essential hypertension  BP controlled on lisinopril-hctz 20-12.5 mg daily. 5. Primary hypothyroidism Euthyroid on levothyroxine 125 mcg daily. 6. Microalbuminuria    7.  Neuropathy   BLE - right foot more severe than left. Instructed on appropriate foot wear and daily foot care. 8. History of CVA (cerebrovascular accident)  Will benefit from GLP-1 RA - cardioprotective. 9. Retinopathy   Eye exam up to date - visits every 3 months for injections. 10. Diabetic retinopathy of both eyes associated with type 2 diabetes mellitus, macular edema presence unspecified, unspecified retinopathy severity ([address]  Eye exam is up-to-date. Instructed caregiver and patient that continuously high glucose levels over time will cause irreversible blindness. Caregiver vu.  Patient is unable to comprehend due to her disability. Follow-up and Dispositions    Return in about 1 month (around 9/11/2022). Tests or Results Reviewed: (see lab dates below in note) HgbA1C, Cr, GFR, Microalbumin/Cr ratio, Lipid Profile,  TSH. History of Present Illness:         Current Diabetes Medications: lantus 72 units BID, humalog 12 units AC TID, metformin 500 mg 2 in the morning and evening, Humalog 15 units every meal - unable to administer regularly  Due to patient eating snacks all day at different times. History of Present Illness:       History given by patient and her  nani. No falls in past year. No history of pancreatitis, DKA, DFW, gastroparesis. Date of DM diagnosis: age 46, year 14, History of GDM      Medication Failures: none       Current symptoms: See Review of Systems       Neuropathy: none       Nephropathy: Stage 2 Kidney Disease   12/28/2021      Cr 0.82, GFR 73, microalbumin/Cr ratio 35   03/31/2022 Cr 0.69, GFR 88, microalbumin/Cr ratio none  05/13/2022      Cr 0.80, GFR >60      Retinopathy: Last eye exam was 11/2021 and demonstrated diabetic retinopathy in both eyes. Eye care specialist is Dr. Alton Villarreal. Diet:    Grazes - cookies and cakes. [place] food. Diet cokes, bottled water.        Exercise:  none       Diabetes education: The patient has not received formal diabetes education. Hemoglobin A1c:   01/14/2018      12.8%   11/26/2018      13.6%   02/08/2021      13.9%   12/28/2021      >15.5%   03/18/2022          12.2%  06/05/2022 12.2%     C-Peptide:  05/13/2022      <0.1     WENDY-65:  05/13/2022      14,258.3 (positive)     Vitamin D:  05/13/2022      26.6        Home blood glucose monitoring frequency:   By review of CGM download over past 28 days  Average blood glucose 353  Time in range 3%  High 10%, Very High 87%  Low 0%, Very Low 0%     Average    Fasting 288    AC lunch 392    AC supper 394    Bedtime 394      Blood glucose levels are uncontrolled, most significant elevations are between 12 and 10 PM.     Hypoglycemia: 5 years ago - EMS called - unable to wake patient who was unconscious. Lowest 40s       Pregnancy: no plans - unable to give birth       Lipids:    02/08/2021  TC- 164, LDL- 82, VLDL- 35,  HDL- 47, TG- 209       Thyroid:                          Lab Results         Component  Value  Date/Time             TSH  0.671  12/28/2021 02:03 PM        TSH  0.099 (L)  02/08/2021 09:35 AM        TSH  7.570 (H)  11/26/2018 11:05 AM        TSH  2.20  01/04/2018 11:25 AM         Other Labs:       Vitamin B12:   12/28/2021      898 ([phone])       BP:                 BP Readings from Last 3 Encounters:        02/09/22  120/76     02/04/22  122/64     02/01/22  118/76             Weight Trends: Wt Readings from Last 3 Encounters:        02/09/22  164 lb (74.4 kg)     01/21/22  165 lb (74.8 kg)     01/12/22  165 lb (74.8 kg)             Medical/Surgical/Social/Family History: Reviewed in Chart       Medications: Reviewed in chart       Allergies   Patient has no known allergies. /72   Pulse 70   Temp 98 °F (36.7 °C) (Tympanic)   Ht 5' 5\" (1.651 m)   Wt 174 lb (78.9 kg)   SpO2 98%   BMI 28.96 kg/m²     Weight Trends:   Wt Readings from Last 3 Encounters:   08/11/22 174 lb (78.9 kg)   06/15/22 170 lb 6.4 oz (77.3 kg)   05/10/22 171 lb (77.6 kg)       Allergies and Medications: Reviewed in Chart    Review of Systems   Constitutional:  Negative for appetite change, diaphoresis, fatigue and unexpected weight change. HENT:  Negative for trouble swallowing and voice change. Eyes:  Negative for visual disturbance. Respiratory:  Negative for cough, shortness of breath and wheezing. Cardiovascular:  Negative for chest pain, palpitations and leg swelling. Gastrointestinal:  Negative for abdominal pain, constipation, diarrhea, nausea and vomiting. Endocrine: Negative for cold intolerance, heat intolerance, polydipsia, polyphagia and polyuria. Genitourinary:  Negative for difficulty urinating and frequency. Musculoskeletal:  Negative for arthralgias, back pain and myalgias. Skin:  Negative for pallor. Neurological:  Negative for dizziness, tremors, weakness, numbness and headaches. Hematological:  Negative for adenopathy. Psychiatric/Behavioral:  Negative for dysphoric mood and sleep disturbance. The patient is not nervous/anxious. Physical Exam  Constitutional:       General: She is not in acute distress. Appearance: Normal appearance. She is normal weight. She is not ill-appearing. HENT:      Head: Normocephalic. Cardiovascular:      Rate and Rhythm: Normal rate and regular rhythm. Pulses: Normal pulses. Pulmonary:      Effort: No respiratory distress. Breath sounds: Normal breath sounds. No wheezing or rhonchi. Chest:      Chest wall: No tenderness. Abdominal:      General: There is no distension. Palpations: Abdomen is soft. Tenderness: There is no abdominal tenderness. There is no guarding. Musculoskeletal:         General: No swelling, tenderness or signs of injury. Arms:       Cervical back: Neck supple. No tenderness. Right lower leg: No edema. Left lower leg: No edema.       Comments: Bruising present on top of Left shoulder where cg tablet Take 5 mg by mouth      Glucagon (GVOKE HYPOPEN 2-PACK) 1 MG/0.2ML SOAJ Inject 1 mg into the skin as needed      levothyroxine (SYNTHROID) 125 MCG tablet Take 125 mcg by mouth daily      lisinopril-hydroCHLOROthiazide (PRINZIDE;ZESTORETIC) 20-12.5 MG per tablet Take 1 tablet by mouth daily      metoprolol succinate (TOPROL XL) 50 MG extended release tablet Take 50 mg by mouth daily      simvastatin (ZOCOR) 40 MG tablet Take 40 mg by mouth       No current facility-administered medications for this visit. TARUN Castañeda NP    On this date 8/11/2022 I have spent 45 minutes reviewing previous notes, test results and face to face with the patient discussing the diagnosis and importance of compliance with the treatment plan as well as documenting on the day of the visit. Portions of this note were generated with the assistance of voice recognition software. As such, some errors in transcription may be present.

## 2022-08-12 ENCOUNTER — TELEPHONE (OUTPATIENT)
Dept: DIABETES SERVICES | Age: 72
End: 2022-08-12

## 2022-08-16 ENCOUNTER — TELEPHONE (OUTPATIENT)
Dept: DIABETES SERVICES | Age: 72
End: 2022-08-16

## 2022-08-19 PROBLEM — E11.319 DIABETIC RETINOPATHY OF BOTH EYES ASSOCIATED WITH TYPE 2 DIABETES MELLITUS (HCC): Status: ACTIVE | Noted: 2022-08-19

## 2022-08-19 PROBLEM — R41.3 MEMORY IMPAIRMENT: Status: ACTIVE | Noted: 2022-02-09

## 2022-08-19 RX ORDER — BLOOD-GLUCOSE TRANSMITTER
EACH MISCELLANEOUS
Qty: 1 EACH | Refills: 3
Start: 2022-08-19 | End: 2022-10-14 | Stop reason: ALTCHOICE

## 2022-08-19 RX ORDER — BLOOD-GLUCOSE SENSOR
EACH MISCELLANEOUS
Qty: 9 EACH | Refills: 3
Start: 2022-08-19 | End: 2022-10-14 | Stop reason: ALTCHOICE

## 2022-08-30 ENCOUNTER — HOSPITAL ENCOUNTER (OUTPATIENT)
Dept: DIABETES SERVICES | Age: 72
Setting detail: RECURRING SERIES
Discharge: HOME | End: 2022-09-02
Payer: MEDICARE

## 2022-08-30 PROCEDURE — G0108 DIAB MANAGE TRN  PER INDIV: HCPCS

## 2022-08-30 SDOH — ECONOMIC STABILITY: FOOD INSECURITY: ADDITIONAL INFORMATION: NO

## 2022-08-30 ASSESSMENT — PATIENT HEALTH QUESTIONNAIRE - PHQ9
SUM OF ALL RESPONSES TO PHQ QUESTIONS 1-9: 0

## 2022-08-30 NOTE — PROGRESS NOTES
This is a one on one appointment. Due to being during [facility], social distancing and mandatory precautions are in place and utilized  Came for diabetes educational assessment today. Provided basic information on carbohydrates, proteins and fats. Educational need/plan: Will attend 2 nutrition/2 diabetes sessions to address the following: diabetes disease process, nutritional management, physical activity, using medications, preventing complications, psychosocial adjustment, goal setting, problem solving, monitoring, behavior change strategies. Hopes to gain the following from this educational program:  Being active, healthy coping, healthy eating, monitoring blood sugars, problem solving, taking medications and reducing risk of complications. Issues Identified:  Patient came with  Tiago Yu for instruction on insulin and Trulicity injections. Informed patient and  that we also received a referral for Diabetes Education. He reports they have not ever had education. He would like education. Asked wife if she would like education and she nods her head yes. Due to appointment cancellation today, able to do assessment followed by injection instruction.  does not have his work schedule and will call back to schedule appointments. They will need one on one education. Instructed will do assessment then instruct and give dave acting and he can give Humalog when she eats. Assessment:  Per  Tiago Yu, patient had a stroke, and since stroke, she is not able to understand ,and he has to do everything for her. Reports, she has memory issues and difficulty thinking, and she does not understand things. Per  she is totally dependent on him. She is pleasant with good contact and does nod her head if asked her a direct question with eye contact. Tiago Yu answered almost all questions for patient.   I did ask her the PHQ9 questions and she did shake her head no to questions or yes.  About wanting to hurt herself or wishing she was dead questions,she did lift her eyebrows when asked the questions and nod ed no. The sleep question the  answered right away and says she has no problem sleeping. They have two daughters that live close and come to check on her. She was taken recently by one of the daughters for a pedicure. Reports first one in a long time. He says daughters check on her when they can. He says she is always home and does not go anywhere, and if she does someone is with her when asked about ID for Diabetes. Did not ask about Diabetes issues part of assessment due to stroke. Reports she does not eat a meal. She pick eats all day long. She eats cookies, cake, cereal, [place] food and hot dogs and watches TV all day. She does not get her Humalog during the day when he is not home because he is not there to give Humalog. He is off 2 days a week. When off work, he will  a [employer] pancake breakfast. She eats pancake and sometimes potatoes, but will not eat egg. Suggested protein shake for protein of just have her drink 1/4 of it to give her 7 grams of protein at start of her meal to help with blood sugars. He reports when he is home, if she eats when he is outside doing something, he does not give her Humalog because he does not know what she ate. Asked about Social Work for help, there may be some things that are free to help him. He says he does the best he can. He reports she does not want anyone coming into the home. But he will try and talk her into it and let us know. Refuses right now Social Work Consult. She wears a Dexcom G6 and blood sugar is showing High. He reports it is always high. He says he tells her not to eat bad food because she will lose her eye sight, and she will have to have shots in her eyes. He says she still eats bad. He says she will not eat good food like beans when did some Nutrition instruct during assessment.

## 2022-08-30 NOTE — PROGRESS NOTES
Second part of their visit is for education on insulin injections. It was agreed he would bring all of her medicine, so it can be given at today's appointment. He did not bring Humalog or Trulicity. He says he has a lot on his mind and forgot. He did bring Toujeo with a new needle on the pen with both covers over needle. He reports he gives her injections only in the back of the arm. Says she will not let him give injections anywhere else. Reviewed orders and instructed Toujeo is 84 units and order says to titrate up by two units every third day until fasting blood sugar is 80 mg/dl- 125 mg/dl. Instructed maximum daily dose of Toujeo is 120 units a day. If still not in range when reach 120 units call doctor. He has only been giving the 84 units. He has not been titrating Toujeo or Humalog. He says you can give her more insulin, and does not matter how much insulin you give her it will not come down. Assured him insulin will reduce blood sugars just have to change slowly. That's why titrate insulin. Dexcom blood sugar is reading high . I gave 86 units today of Toujeo in two injections as pen only goes to 80 units. I gave 43 units and 43 units to equal 86 units of Toujeo. Delivered in the abdomen. Instructed how to prep needle and to prime. Instructed on orders as stated above. Instructed on injection sites on the body and physically showed him wear to rotate. Kept patient very involved in the process and asked her permission before touching her and explained to her what I was doing to keep her involved. Good eye contact. She did begin to lift her shirt when asked if I could look at her abdomen. She also helped me lower band on underwear to show site rotation. She did shake her head yes when asked if I could deliver injections in abdomen? She also nod ed okay to show on her body where insulin can be given,and how to rotate sites to prevent scar tissue. She was very pleasant and agreeable.   Asked her if hurt bad when injections given ,and she nod ed no.  did say her stroke did effect her speech, but  she nod to questions yes or no during sessions. Instructed  to clean site with alcohol and also put  alcohol on glove or hand when home. Lightly tap her abdomen at injection site before inserting needle and will not hurt as much. Also instructed on Humalog orders. It is 15 units per meal ( three times a day), and to follow sliding scale. Sliding scale is added to the 15 units. The maximum amount  you can give is 100 units a day of Humalog. So if she is high all the time, which usually means over 500-600 mg/dl on a glucometer and (400 mg/dl on Dexcom), the maximum you could give three times a day is 33 units per meal. Instructed him how to add per 50 mg/dl over 150 mg/dl. Before education today, he has only been giving 15 units for a meal when home, and if not outside and does not know what she ate. See previous note concerning his work schedule. Instructed on Trulicity. He takes it out of refrigerator before administration and allows Trulicity to warm,  as patients tells him painful if cold. At first of session said Trulicity due today. At end of session, said it was due yesterday. He said he will take it out when he gets home and give today. Instructed try to give weekly same day. Reports a lot on  his mind and forgot. Instructed on administration of Trulicity as well. Instructed also on insulin storage and use of insulin time line of syringe once out of refrigerator, and to take one pen out at a time for use. No medication changes since last visit.  No new surgery or procedures

## 2022-09-07 ENCOUNTER — TELEPHONE (OUTPATIENT)
Dept: ENDOCRINOLOGY | Age: 72
End: 2022-09-07

## 2022-09-07 NOTE — TELEPHONE ENCOUNTER
Pt  [facility] came into the office and stated pt had removed her Dexcom G 6 sensor and transmitter, [facility] stated, pt's reader gave a message that it was time to change sensor. When [facility] went to ask pt where was her Dexcom, Pt told him, she did not know. [facility] came into the office to request a transmitter, [facility] stated pt has the sensors, but does not have another transmitter. I advised pt we currently did not have any transmitters at this time do to still not having received our sample supplies. But I did tell pt's  he can call Dexcom directly and let them know what happened and see if they can give him a replacement. [facility] stated he had called [employer], but did not know he could actually call Dexcom directly. .    Pt  was provided with the number to Dexcom, and I let him know if he needed assistance after that to call me, but in the mean time, pt will need to manually check blood sugars until Dexcom supplies is received. [facility] verbalized understanding.

## 2022-09-13 ENCOUNTER — FOLLOWUP TELEPHONE ENCOUNTER (OUTPATIENT)
Dept: DIABETES SERVICES | Age: 72
End: 2022-09-13

## 2022-09-13 NOTE — TELEPHONE ENCOUNTER
Second attempt to contact pt/and or spouse to set up diabetes education. Spouse was to call to set up education but we haven't heard from him. Voice mail not set up. Will send letter today. If we don't hear back by 9/27/22 we will close.

## 2022-10-14 ENCOUNTER — OFFICE VISIT (OUTPATIENT)
Dept: ENDOCRINOLOGY | Age: 72
End: 2022-10-14
Payer: MEDICARE

## 2022-10-14 VITALS
DIASTOLIC BLOOD PRESSURE: 74 MMHG | HEART RATE: 102 BPM | HEIGHT: 65 IN | WEIGHT: 173 LBS | SYSTOLIC BLOOD PRESSURE: 116 MMHG | OXYGEN SATURATION: 96 % | BODY MASS INDEX: 28.82 KG/M2

## 2022-10-14 DIAGNOSIS — G62.9 NEUROPATHY: ICD-10-CM

## 2022-10-14 DIAGNOSIS — E13.9 LADA (LATENT AUTOIMMUNE DIABETES IN ADULTS), MANAGED AS TYPE 2 (HCC): Primary | ICD-10-CM

## 2022-10-14 LAB — HBA1C MFR BLD: 12.6 %

## 2022-10-14 PROCEDURE — 3046F HEMOGLOBIN A1C LEVEL >9.0%: CPT | Performed by: DIETITIAN, REGISTERED

## 2022-10-14 PROCEDURE — 99215 OFFICE O/P EST HI 40 MIN: CPT | Performed by: DIETITIAN, REGISTERED

## 2022-10-14 PROCEDURE — 1036F TOBACCO NON-USER: CPT | Performed by: DIETITIAN, REGISTERED

## 2022-10-14 PROCEDURE — G8417 CALC BMI ABV UP PARAM F/U: HCPCS | Performed by: DIETITIAN, REGISTERED

## 2022-10-14 PROCEDURE — G8400 PT W/DXA NO RESULTS DOC: HCPCS | Performed by: DIETITIAN, REGISTERED

## 2022-10-14 PROCEDURE — 2022F DILAT RTA XM EVC RTNOPTHY: CPT | Performed by: DIETITIAN, REGISTERED

## 2022-10-14 PROCEDURE — 1090F PRES/ABSN URINE INCON ASSESS: CPT | Performed by: DIETITIAN, REGISTERED

## 2022-10-14 PROCEDURE — G8484 FLU IMMUNIZE NO ADMIN: HCPCS | Performed by: DIETITIAN, REGISTERED

## 2022-10-14 PROCEDURE — G8427 DOCREV CUR MEDS BY ELIG CLIN: HCPCS | Performed by: DIETITIAN, REGISTERED

## 2022-10-14 PROCEDURE — 83036 HEMOGLOBIN GLYCOSYLATED A1C: CPT | Performed by: DIETITIAN, REGISTERED

## 2022-10-14 PROCEDURE — 1123F ACP DISCUSS/DSCN MKR DOCD: CPT | Performed by: DIETITIAN, REGISTERED

## 2022-10-14 PROCEDURE — 3017F COLORECTAL CA SCREEN DOC REV: CPT | Performed by: DIETITIAN, REGISTERED

## 2022-10-14 RX ORDER — MULTIVITAMIN WITH FOLIC ACID 400 MCG
TABLET ORAL
COMMUNITY
Start: 2022-09-24

## 2022-10-14 RX ORDER — DULAGLUTIDE 3 MG/.5ML
3 INJECTION, SOLUTION SUBCUTANEOUS WEEKLY
Qty: 2 ML | Refills: 3 | Status: SHIPPED | OUTPATIENT
Start: 2022-10-14

## 2022-10-14 RX ORDER — BLOOD-GLUCOSE SENSOR
EACH MISCELLANEOUS
Qty: 9 EACH | Refills: 3
Start: 2022-10-14

## 2022-10-14 RX ORDER — INSULIN LISPRO 100 [IU]/ML
15 INJECTION, SOLUTION INTRAVENOUS; SUBCUTANEOUS
Qty: 30 ML | Refills: 11
Start: 2022-10-14

## 2022-10-14 RX ORDER — BLOOD-GLUCOSE TRANSMITTER
EACH MISCELLANEOUS
Qty: 1 EACH | Refills: 3
Start: 2022-10-14

## 2022-10-14 RX ORDER — INSULIN GLARGINE 300 U/ML
84 INJECTION, SOLUTION SUBCUTANEOUS EVERY MORNING
Qty: 39 ML | Refills: 11
Start: 2022-10-14

## 2022-10-14 ASSESSMENT — ENCOUNTER SYMPTOMS
BACK PAIN: 0
ABDOMINAL PAIN: 0
TROUBLE SWALLOWING: 0
NAUSEA: 0
CONSTIPATION: 0
SHORTNESS OF BREATH: 0
VOICE CHANGE: 0
WHEEZING: 0
COUGH: 0
VOMITING: 1
DIARRHEA: 0

## 2022-10-14 NOTE — PROGRESS NOTES
[facility]    VY Mclaughlin  [address], [address] [facility], [address]  Phone [phone]  Facsimile [phone]      Reason for visit: Bridgette NGUYEN (1950) is here for follow up of Type 2 Diabetes Mellitus. ASSESSMENT AND PLAN:    1. ELLIOTT (latent autoimmune diabetes in adults), managed as type 2 ([facility].)  A1c is 12.6%. Glycemic control is suboptimal.  Patient has stopped wearing her Dexcom because her  reports she is pulling it off in the night. She has pulled off 3 Dexcom sensors.  says he will reattempt placing Dexcom back on her, but it is likely she will pull it off again.  is checking blood sugars at least 1 time daily fasting. Fasting readings range between 118 and 400.  reports he goes to work during the day and is gone all day and patient stays alone at home. He is concerned that she will have a low blood sugar during the day. As a result, he gives her less Toujeo insulin at times, during the week, reducing her total daily dose to 30 units instead of recommended and prescribed dose of 84. The next day her fasting blood sugars go back up into the 300s. Instructed  that he must give her basal insulin once daily every morning as prescribed. Instructed it is okay to lower the basal dose by 2 units if she wakes up in the morning and has a blood sugar less than 80 or if her blood sugar at 2 AM is below 80. Instructed  to check wife's bG at 2 AM several days a week.  reports patient is tolerating Trulicity 1.5 mg and she vomited 1 time a few days ago, but he thought it was viral.  Humalog is administered at least 0-1 time daily instead of prescribed 3 times per day with meals, because  is concerned her blood sugars will drop too low as a result of taking Humalog.  is primary caregiver of wife and is her healthcare and legal power of .   We discussed the importance of having his daughters come over during the day to check on his wife while she is alone. I asked if daughters can assist with patient's care, and  says no.  says daughters come over and take her out to eat lunch. Compliance is a major issue as  continues to make major decisions for wife and she is incapable of remembering how to take medication due to memory impairment. It is questionable if  is giving insulin appropriately at the correct time in the correct dose in the correct location. Patient's blood sugars continue to remain elevated, out-of-control as a result. --- Instructed  and patient that patient must take insulin because she her body is not making enough insulin. Instructed  that it will cause great harm including blindness and death of patient if she does not take her insulin. Instructed  to avoid skipping doses of Toujeo. --- Increase Trulicity to 3 mg weekly to assist with compliance of glucose control, weight reduction, and A1c reduction. ---  The  is the primary caregiver who makes all decisions in the home with regard to the patient's care. Patient is likely unsafe at home during the day as she as at risk for hypoglycemia unawareness. --- 's ability to comprehend how to manage patient's medications and his ability to follow directions is questionable and may be a primary reason for patient's lack of glycemic control. --- Instructed  and patient regarding the common GLP-1 RA/Trulicity side effects of nausea, vomiting and diarrhea and the less common associations of pancreatitis and thyroid C-cell tumors. --- Will reorder neurology referral, social work referral to assist with evaluation of resources to assist with compliance for this  and patient.   --- Diabetes education previous referral placed -  and patient never attended and did not respond to phone calls and voicemails requesting their attendance to diabetes education. --- F/u in 1 months    - TOUJEO MAX SOLOSTAR 300 UNIT/ML SOPN; Inject 84 Units into the skin every morning Titrate by 2 units every 3 days to fasting BG goal of  mg/dL. Max Daily Dose: 120 units  Dispense: 39 mL; Refill: 11  - HUMALOG KWIKPEN 100 UNIT/ML SOPN; Inject 15 Units into the skin 3 times daily (before meals) Take 3 units for every 50 points greater than 150 mg/dL. Take with first bite of food. Max daily dose: 100 units  Dispense: 30 mL; Refill: 11  - TRULICITY 3 QJ/2.7EC SOPN; Inject 3 mg into the skin once a week  Dispense: 2 mL; Refill: 3  - Continuous Blood Gluc Sensor (DEXCOM G6 SENSOR) MISC; Change every 10 days as directed. Dispense: 9 each; Refill: 3  - Continuous Blood Gluc Transmit (DEXCOM G6 TRANSMITTER) MISC; Change every 90 days as directed. Dispense: 1 each; Refill: 3  - Lipid Panel; Future  - Comprehensive Metabolic Panel; Future  - Microalbumin / Creatinine Urine Ratio; Future  - AMB POC HEMOGLOBIN A1C  - [facility] - Care Coordination/Social Work - [facility] Care Management  - [facility] - [facility] Diabetic Treatment    2. Memory impairment  Patient is unable to safely administer medications independenty. Previous referral to neurology placed. Per , \"patient does not want to go\". When I asked patient if she wanted to go, she said \"huh? \". It is unclear if patient does not want to go to neurology appointment vs.  is not wanting patient to go?  -- Aricept 5 mg daily. --- referral to diabetes education 1:1 training on insulin administration.  - [facility] - Jessica Rosales DO, Neurology, [place]       3. Hypercholesterolemia  Last LDL 82 above goal of less than 70 on simivastatin 40 mg. Instructed cg to administer at bedtime. 4. Essential hypertension  BP controlled on lisinopril-hctz 20-12.5 mg daily. BP Readings from Last 3 Encounters:   10/14/22 116/74   08/11/22 116/72   06/15/22 130/74      5.  Primary hypothyroidism Euthyroid on levothyroxine 125 mcg daily.       6. Microalbuminuria     7. Neuropathy   BLE - right foot more severe than left. Instructed on appropriate foot wear and daily foot care. 8. History of CVA (cerebrovascular accident)  Will benefit from GLP-1 RA - cardioprotective, Trulicity. 9. Retinopathy   Eye exam up to date - visits every 3 months for injections. 10. Diabetic retinopathy of both eyes associated with type 2 diabetes mellitus, macular edema presence unspecified, unspecified retinopathy severity ([address]  Eye exam is up-to-date. Instructed caregiver and patient that continuously high glucose levels over time will cause irreversible blindness. Caregiver vu.  Patient is unable to comprehend due to her disability. Tests or Results Reviewed: (see lab dates below in note) HgbA1C, Cr, GFR, Microalbumin/Cr ratio, Lipid Profile,  TSH. History of Present Illness:         Current Diabetes Medications: lantus 80-90 humalog 15 units once a day, metformin 500 mg 2 in the morning and evening,  Due to patient eating snacks all day at different times. History of Present Illness:       History given by patient and her  nani. No falls in past year. No history of pancreatitis, DKA, DFW, gastroparesis. Date of DM diagnosis: age 46, year 14, History of GDM      Medication Failures: metformin causes vomiting       Current symptoms: See Review of Systems       Neuropathy: none       Nephropathy: Stage 2 Kidney Disease   12/28/2021      Cr 0.82, GFR 73, microalbumin/Cr ratio 35   03/31/2022 Cr 0.69, GFR 88, microalbumin/Cr ratio none  05/13/2022      Cr 0.80, GFR >60      Retinopathy: Last eye exam was 11/2021 and demonstrated diabetic retinopathy in both eyes. Eye care specialist is Dr. Monserrat Davila. Eye exam- \"scheduled soon\"- 10/24/2022. Diet:    Grazes - cookies and cakes. [employer] food. Diet cokes, bottled water.        Exercise:  none       Diabetes education: The patient has not received formal diabetes education. Hemoglobin A1c:   2018      12.8%   2018      13.6%   2021      13.9%   2021      >15.5%   2022          12.2%  2022      12.2%  10/14/2022 12.6%     C-Peptide:  2022      <0.1    Fasting  229     WENDY-65:  2022      14,258.3 (positive)     Vitamin D:  2022      26.6           Blood glucose levels are uncontrolled, most significant elevations are between 12 and 10 PM.      Hypoglycemia: 5 years ago - EMS called - unable to wake patient who was unconscious. Lowest 40s       Pregnancy: no plans - unable to give birth       Lipids:    2021  TC- 164, LDL- 82, VLDL- 35,  HDL- 47, TG- 209       Thyroid:                          Lab Results         Component  Value  Date/Time             TSH  0.671  2021 02:03 PM        TSH  0.099 (L)  2021 09:35 AM        TSH  7.570 (H)  2018 11:05 AM        TSH  2.20  2018 11:25 AM         Other Labs:       Vitamin B12:   2021      898 ([phone])       BP:                 BP Readings from Last 3 Encounters:        22  120/76     22  122/64     22  118/76             Weight Trends: Wt Readings from Last 3 Encounters:        22  164 lb (74.4 kg)     22  165 lb (74.8 kg)     22  165 lb (74.8 kg)             Medical/Surgical/Social/Family History: Reviewed in Chart       Medications: Reviewed in chart       Allergies   Patient has no known allergies. /74   Pulse (!) 102   Ht 5' 5\" (1.651 m)   Wt 173 lb (78.5 kg)   SpO2 96%   BMI 28.79 kg/m²     Weight Trends: Wt Readings from Last 3 Encounters:   10/14/22 173 lb (78.5 kg)   22 174 lb (78.9 kg)   06/15/22 170 lb 6.4 oz (77.3 kg)       Allergies and Medications: Reviewed in Chart    Review of Systems   Constitutional:  Negative for appetite change, diaphoresis, fatigue and unexpected weight change.    HENT:  Negative for trouble swallowing and voice change. Eyes:  Negative for visual disturbance. Respiratory:  Negative for cough, shortness of breath and wheezing. Cardiovascular:  Negative for chest pain, palpitations and leg swelling. Gastrointestinal:  Positive for vomiting (very little - the other day). Negative for abdominal pain, constipation, diarrhea and nausea. Endocrine: Negative for cold intolerance, heat intolerance, polydipsia, polyphagia and polyuria. Genitourinary:  Negative for difficulty urinating and frequency. Musculoskeletal:  Negative for arthralgias, back pain and myalgias. Skin:  Negative for pallor. Neurological:  Negative for dizziness, tremors, weakness, numbness and headaches. Hematological:  Negative for adenopathy. Psychiatric/Behavioral:  Negative for dysphoric mood and sleep disturbance. The patient is not nervous/anxious. Physical Exam  Constitutional:       General: She is not in acute distress. Appearance: Normal appearance. She is normal weight. She is not ill-appearing. HENT:      Head: Normocephalic. Cardiovascular:      Rate and Rhythm: Normal rate and regular rhythm. Pulses: Normal pulses. Pulmonary:      Effort: No respiratory distress. Breath sounds: Normal breath sounds. No wheezing or rhonchi. Chest:      Chest wall: No tenderness. Abdominal:      General: There is no distension. Palpations: Abdomen is soft. Tenderness: There is no abdominal tenderness. There is no guarding. Musculoskeletal:         General: No swelling, tenderness or signs of injury. Cervical back: Neck supple. No tenderness. Right lower leg: No edema. Left lower leg: No edema. Feet:      Right foot:      Skin integrity: Skin integrity normal. No ulcer. Left foot:      Skin integrity: Skin integrity normal. No ulcer. Lymphadenopathy:      Cervical: No cervical adenopathy. Skin:     General: Skin is warm and dry.       Findings: No erythema or rash.   Neurological:      Mental Status: She is alert. Motor: No weakness. Psychiatric:         Mood and Affect: Mood normal.         Behavior: Behavior normal.       Orders Placed This Encounter   Procedures    Lipid Panel     Standing Status:   Future     Standing Expiration Date:   10/14/2023     Order Specific Question:   Is Patient Fasting?/# of Hours     Answer:   yes     Order Specific Question:   Has the patient fasted? Answer:   Yes    Comprehensive Metabolic Panel     Standing Status:   Future     Standing Expiration Date:   10/14/2023    Microalbumin / Creatinine Urine Ratio     Standing Status:   Future     Standing Expiration Date:   10/14/2023    [address] Aldair Moses DO, Neurology, [place]     Referral Priority:   Routine     Referral Type:   Eval and Treat     Referral Reason:   Specialty Services Required     Referred to Provider:   Justus Adams DO     Requested Specialty:   Neurology     Number of Visits Requested:   1    [facility] - [facility] Management     Referral Priority:   Routine     Referral Type:   Eval and Treat     Referral Reason:   Specialty Services Required     Number of Visits Requested:   1    [address] [facility] Diabetic Treatment     Referral Priority:   Routine     Referral Type:   Eval and Treat     Referral Reason:   Specialty Services Required     Number of Visits Requested:   1    AMB POC HEMOGLOBIN A1C       Current Outpatient Medications   Medication Sig Dispense Refill    Multiple Vitamin (MULTIVITAMIN) tablet       TOUJEO MAX SOLOSTAR 300 UNIT/ML SOPN Inject 84 Units into the skin every morning Titrate by 2 units every 3 days to fasting BG goal of  mg/dL. Max Daily Dose: 120 units 39 mL 11    HUMALOG KWIKPEN 100 UNIT/ML SOPN Inject 15 Units into the skin 3 times daily (before meals) Take 3 units for every 50 points greater than 150 mg/dL. Take with first bite of food.  Max daily dose: 100 units 30 mL 11    TRULICITY 3 XB/3.7GW SOPN Inject 3 mg into the skin once a week 2 mL 3    Continuous Blood Gluc Sensor (DEXCOM G6 SENSOR) MISC Change every 10 days as directed. 9 each 3    Continuous Blood Gluc Transmit (DEXCOM G6 TRANSMITTER) MISC Change every 90 days as directed. 1 each 3    aspirin 81 MG EC tablet The details of the medication are not available because there are pending changes by a home health clinician. donepezil (ARICEPT) 5 MG tablet Take 5 mg by mouth      Glucagon (GVOKE HYPOPEN 2-PACK) 1 MG/0.2ML SOAJ Inject 1 mg into the skin as needed      levothyroxine (SYNTHROID) 125 MCG tablet Take 125 mcg by mouth daily      lisinopril-hydroCHLOROthiazide (PRINZIDE;ZESTORETIC) 20-12.5 MG per tablet Take 1 tablet by mouth daily      metoprolol succinate (TOPROL XL) 50 MG extended release tablet Take 50 mg by mouth daily      simvastatin (ZOCOR) 40 MG tablet Take 40 mg by mouth       No current facility-administered medications for this visit. TARUN Pineda NP    On this date 10/14/2022 I have spent 45 minutes reviewing previous notes, test results and face to face with the patient discussing the diagnosis and importance of compliance with the treatment plan as well as documenting on the day of the visit. Portions of this note were generated with the assistance of voice recognition software. As such, some errors in transcription may be present.

## 2022-10-18 ENCOUNTER — CARE COORDINATION (OUTPATIENT)
Dept: CARE COORDINATION | Facility: CLINIC | Age: 72
End: 2022-10-18

## 2022-10-18 ENCOUNTER — TELEPHONE (OUTPATIENT)
Dept: DIABETES SERVICES | Age: 72
End: 2022-10-18

## 2022-10-18 NOTE — TELEPHONE ENCOUNTER
Received a second referral for 1:1 training on insulin injection. He had this instruction with wife present on 8-. Will not per juan.  This is a duplicate referral.

## 2022-10-19 ENCOUNTER — CARE COORDINATION (OUTPATIENT)
Dept: CARE COORDINATION | Facility: CLINIC | Age: 72
End: 2022-10-19

## 2022-10-19 NOTE — CARE COORDINATION
2nd VM left with pt regarding referral.  Will try once more in 5 business days before closing referral/.

## 2022-10-25 ENCOUNTER — CARE COORDINATION (OUTPATIENT)
Dept: CARE COORDINATION | Facility: CLINIC | Age: 72
End: 2022-10-25

## 2022-10-25 NOTE — CARE COORDINATION
Final VM left with pt regarding referral from PCP. If no call back is received within 5 business days, referral will be closed.

## 2022-11-01 ENCOUNTER — CARE COORDINATION (OUTPATIENT)
Dept: CARE COORDINATION | Facility: CLINIC | Age: 72
End: 2022-11-01

## 2022-11-16 DIAGNOSIS — E13.9 LADA (LATENT AUTOIMMUNE DIABETES IN ADULTS), MANAGED AS TYPE 2 (HCC): ICD-10-CM

## 2022-11-17 LAB
ALBUMIN SERPL-MCNC: 3.5 G/DL (ref 3.2–4.6)
ALBUMIN/GLOB SERPL: 1 {RATIO} (ref 0.4–1.6)
ALP SERPL-CCNC: 98 U/L (ref 50–136)
ALT SERPL-CCNC: 22 U/L (ref 12–65)
ANION GAP SERPL CALC-SCNC: 8 MMOL/L (ref 2–11)
AST SERPL-CCNC: 10 U/L (ref 15–37)
BILIRUB SERPL-MCNC: 0.4 MG/DL (ref 0.2–1.1)
BUN SERPL-MCNC: 23 MG/DL (ref 8–23)
CALCIUM SERPL-MCNC: 9.1 MG/DL (ref 8.3–10.4)
CHLORIDE SERPL-SCNC: 100 MMOL/L (ref 101–110)
CHOLEST SERPL-MCNC: 227 MG/DL
CO2 SERPL-SCNC: 28 MMOL/L (ref 21–32)
CREAT SERPL-MCNC: 0.6 MG/DL (ref 0.6–1)
GLOBULIN SER CALC-MCNC: 3.5 G/DL (ref 2.8–4.5)
GLUCOSE SERPL-MCNC: 211 MG/DL (ref 65–100)
HDLC SERPL-MCNC: 47 MG/DL (ref 40–60)
HDLC SERPL: 4.8 {RATIO}
LDLC SERPL CALC-MCNC: 130.2 MG/DL
POTASSIUM SERPL-SCNC: 4.2 MMOL/L (ref 3.5–5.1)
PROT SERPL-MCNC: 7 G/DL (ref 6.3–8.2)
SODIUM SERPL-SCNC: 136 MMOL/L (ref 133–143)
TRIGL SERPL-MCNC: 249 MG/DL (ref 35–150)
VLDLC SERPL CALC-MCNC: 49.8 MG/DL (ref 6–23)

## 2022-11-21 ENCOUNTER — OFFICE VISIT (OUTPATIENT)
Dept: ENDOCRINOLOGY | Age: 72
End: 2022-11-21
Payer: MEDICARE

## 2022-11-21 VITALS
BODY MASS INDEX: 28.66 KG/M2 | HEART RATE: 106 BPM | SYSTOLIC BLOOD PRESSURE: 124 MMHG | DIASTOLIC BLOOD PRESSURE: 78 MMHG | HEIGHT: 65 IN | OXYGEN SATURATION: 98 % | WEIGHT: 172 LBS

## 2022-11-21 DIAGNOSIS — E13.9 LADA (LATENT AUTOIMMUNE DIABETES IN ADULTS), MANAGED AS TYPE 2 (HCC): Primary | ICD-10-CM

## 2022-11-21 PROCEDURE — G8400 PT W/DXA NO RESULTS DOC: HCPCS | Performed by: DIETITIAN, REGISTERED

## 2022-11-21 PROCEDURE — 95251 CONT GLUC MNTR ANALYSIS I&R: CPT | Performed by: DIETITIAN, REGISTERED

## 2022-11-21 PROCEDURE — 99215 OFFICE O/P EST HI 40 MIN: CPT | Performed by: DIETITIAN, REGISTERED

## 2022-11-21 PROCEDURE — 1090F PRES/ABSN URINE INCON ASSESS: CPT | Performed by: DIETITIAN, REGISTERED

## 2022-11-21 PROCEDURE — 3046F HEMOGLOBIN A1C LEVEL >9.0%: CPT | Performed by: DIETITIAN, REGISTERED

## 2022-11-21 PROCEDURE — G8427 DOCREV CUR MEDS BY ELIG CLIN: HCPCS | Performed by: DIETITIAN, REGISTERED

## 2022-11-21 PROCEDURE — G8484 FLU IMMUNIZE NO ADMIN: HCPCS | Performed by: DIETITIAN, REGISTERED

## 2022-11-21 PROCEDURE — 1036F TOBACCO NON-USER: CPT | Performed by: DIETITIAN, REGISTERED

## 2022-11-21 PROCEDURE — 3074F SYST BP LT 130 MM HG: CPT | Performed by: DIETITIAN, REGISTERED

## 2022-11-21 PROCEDURE — 1123F ACP DISCUSS/DSCN MKR DOCD: CPT | Performed by: DIETITIAN, REGISTERED

## 2022-11-21 PROCEDURE — 2022F DILAT RTA XM EVC RTNOPTHY: CPT | Performed by: DIETITIAN, REGISTERED

## 2022-11-21 PROCEDURE — 3078F DIAST BP <80 MM HG: CPT | Performed by: DIETITIAN, REGISTERED

## 2022-11-21 PROCEDURE — 3017F COLORECTAL CA SCREEN DOC REV: CPT | Performed by: DIETITIAN, REGISTERED

## 2022-11-21 PROCEDURE — G8417 CALC BMI ABV UP PARAM F/U: HCPCS | Performed by: DIETITIAN, REGISTERED

## 2022-11-21 RX ORDER — DULAGLUTIDE 3 MG/.5ML
3 INJECTION, SOLUTION SUBCUTANEOUS WEEKLY
Qty: 2 ML | Refills: 11 | Status: SHIPPED | OUTPATIENT
Start: 2022-11-21

## 2022-11-21 RX ORDER — INSULIN GLARGINE 300 U/ML
84 INJECTION, SOLUTION SUBCUTANEOUS EVERY MORNING
Qty: 39 ML | Refills: 11
Start: 2022-11-21

## 2022-11-21 RX ORDER — METFORMIN HYDROCHLORIDE 500 MG/1
TABLET, EXTENDED RELEASE ORAL
COMMUNITY
Start: 2022-10-19 | End: 2022-11-21

## 2022-11-21 RX ORDER — INSULIN LISPRO 100 [IU]/ML
15 INJECTION, SOLUTION INTRAVENOUS; SUBCUTANEOUS
Qty: 30 ML | Refills: 11
Start: 2022-11-21

## 2022-11-21 ASSESSMENT — ENCOUNTER SYMPTOMS
VOICE CHANGE: 0
VOMITING: 1
NAUSEA: 0
COUGH: 0
BACK PAIN: 0
SHORTNESS OF BREATH: 0
ABDOMINAL PAIN: 0
DIARRHEA: 0
WHEEZING: 0
TROUBLE SWALLOWING: 0
CONSTIPATION: 0

## 2022-11-21 NOTE — PROGRESS NOTES
[facility] ENDOCRINOLOGY   AND   [facility]    [address], [address] [facility], [address] [place] [address] Mag[place]  Phone [phone]  Facsimile [phone]      Reason for visit: Valerie NGUYEN (1950) is here for follow up of Type 2 Diabetes Mellitus. ASSESSMENT AND PLAN:    Interpretation of 72 hour glucose monitor: At least 72 hours of data were reviewed. The patient utilizes a Dexcom G6 continuous glucose monitoring system. The average glucose during the reviewed timeframe was 343 with a standard deviation of 78 (time in range 5%, hyperglycemia 94%, hypoglycemia <1%). There is a pattern of constant hyperglycemia. 1. 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 Glycemic control is suboptimal. , primary cg, reports he sometimes forgets to administer insulin as prescribed. Patient receives Humalog <1 time daily. Cg reports he does not think Trulicity 3 does anything for the patient and that it is $50 per shot. However, he only administered 2 injections, thus patient has not taken it for a full month. --- Instructed  to set alarms on his phone to administer insulin by.  --- Recommended family discussion to set up agreement with patient's daughter - so she can come over and administer insulin at appropriate time. --- Recommended home care agency to assist with medication administration. Patient and cg declined. -- Recommend patient return to PCP for follow up for future and ongoing diabetes management. --- Explained to cg and patient - I can adjust medications, but patient has to take medications in order to improve diabetes management. --- NP available by phone or [place] until 2022.    2. Memory impairment  Patient is unable to safely administer medications independenty. Previous referral to neurology placed.  has not taken patient to neurologist due to he reports he forgot.   -- Aricept 5 mg daily.  --- referral to diabetes education 1:1 training on insulin administration.  - [facility], DO, Neurology, [place]       3. Hypercholesterolemia  Last LDL 82 above goal of less than 70 on simivastatin 40 mg. Instructed cg to administer at bedtime. Defer cholesterol management to PCP. 4. Essential hypertension  BP controlled on lisinopril-hctz 20-12.5 mg daily. BP Readings from Last 3 Encounters:   11/21/22 124/78   10/14/22 116/74   08/11/22 116/72      5. Primary hypothyroidism Euthyroid on levothyroxine 125 mcg daily. 6. Microalbuminuria     7. Neuropathy   BLE - right foot more severe than left. Instructed on appropriate foot wear and daily foot care. 8. History of CVA (cerebrovascular accident)  Will benefit from GLP-1 RA - cardioprotective, Trulicity. 9. Retinopathy   Eye exam up to date - visits every 3 months for injections. 10. Diabetic retinopathy of both eyes associated with type 2 diabetes mellitus, macular edema presence unspecified, unspecified retinopathy severity ([address]  Eye exam is up-to-date. Instructed caregiver and patient that continuously high glucose levels over time will cause irreversible blindness. Caregiver vu.  Patient is unable to comprehend due to her disability. Tests or Results Reviewed: (see lab dates below in note) HgbA1C, Cr, GFR, Microalbumin/Cr ratio, Lipid Profile,  TSH. History of Present Illness:         Current Diabetes Medications: lantus 80-90 humalog 15 units once a day, metformin 500 mg 2 in the morning and evening,  Due to patient eating snacks all day at different times. History of Present Illness:       History given by patient and her  nani. No falls in past year. No history of pancreatitis, DKA, DFW, gastroparesis.        Date of DM diagnosis: age 46, year 14, History of GDM      Medication Failures: metformin causes vomiting       Current symptoms: See Review of Systems Neuropathy: none       Nephropathy: Stage 2 Kidney Disease   2021      Cr 0.82, GFR 73, microalbumin/Cr ratio 35   2022 Cr 0.69, GFR 88, microalbumin/Cr ratio none  2022      Cr 0.80, GFR >60      Retinopathy: Last eye exam was 2021 and demonstrated diabetic retinopathy in both eyes. Eye care specialist is Dr. Trisha Solis. Eye exam- \"scheduled soon\"- 10/24/2022. Diet:    Grazes - cookies and cakes. [place] food. Diet cokes, bottled water. Exercise:  none       Diabetes education: The patient has not received formal diabetes education. Hemoglobin A1c:   2018      12.8%   2018      13.6%   2021      13.9%   2021      >15.5%   2022          12.2%  2022      12.2%  10/14/2022 12.6%     C-Peptide:  2022      <0.1    Fasting  229     WENDY-65:  2022      14,258.3 (positive)     Vitamin D:  2022      26.6       Blood glucose levels are uncontrolled, most significant elevations are between 12 and 10 PM.      Hypoglycemia: 5 years ago - EMS called - unable to wake patient who was unconscious. Lowest 40s       Pregnancy: no plans - unable to give birth       Lipids:    2021  TC- 164, LDL- 82, VLDL- 35,  HDL- 47, TG- 209       Thyroid:                          Lab Results         Component  Value  Date/Time             TSH  0.671  2021 02:03 PM        TSH  0.099 (L)  2021 09:35 AM        TSH  7.570 (H)  2018 11:05 AM        TSH  2.20  2018 11:25 AM         Other Labs:       Vitamin B12:   2021      898 ([phone])       BP:                 BP Readings from Last 3 Encounters:        22  120/76     22  122/64     22  118/76             Weight Trends:                  Wt Readings from Last 3 Encounters:        22  164 lb (74.4 kg)     22  165 lb (74.8 kg)     22  165 lb (74.8 kg)             Medical/Surgical/Social/Family History: Reviewed in Chart Medications: Reviewed in chart       Allergies   Patient has no known allergies. /78   Pulse (!) 106   Ht 5' 5\" (1.651 m)   Wt 172 lb (78 kg)   SpO2 98%   BMI 28.62 kg/m²     Weight Trends: Wt Readings from Last 3 Encounters:   11/21/22 172 lb (78 kg)   10/14/22 173 lb (78.5 kg)   08/11/22 174 lb (78.9 kg)       Allergies and Medications: Reviewed in Chart    Review of Systems   Constitutional:  Negative for appetite change, diaphoresis, fatigue and unexpected weight change. HENT:  Negative for trouble swallowing and voice change. Eyes:  Negative for visual disturbance. Respiratory:  Negative for cough, shortness of breath and wheezing. Cardiovascular:  Negative for chest pain, palpitations and leg swelling. Gastrointestinal:  Positive for vomiting (very little - the other day). Negative for abdominal pain, constipation, diarrhea and nausea. Endocrine: Negative for cold intolerance, heat intolerance, polydipsia, polyphagia and polyuria. Genitourinary:  Negative for difficulty urinating and frequency. Musculoskeletal:  Negative for arthralgias, back pain and myalgias. Skin:  Negative for pallor. Neurological:  Negative for dizziness, tremors, weakness, numbness and headaches. Hematological:  Negative for adenopathy. Psychiatric/Behavioral:  Negative for dysphoric mood and sleep disturbance. The patient is not nervous/anxious. Physical Exam  Constitutional:       General: She is not in acute distress. Appearance: Normal appearance. She is normal weight. She is not ill-appearing. HENT:      Head: Normocephalic. Cardiovascular:      Rate and Rhythm: Normal rate and regular rhythm. Pulses: Normal pulses. Pulmonary:      Effort: No respiratory distress. Breath sounds: Normal breath sounds. No wheezing or rhonchi. Chest:      Chest wall: No tenderness. Abdominal:      General: There is no distension. Palpations: Abdomen is soft.       Tenderness: There is no abdominal tenderness. There is no guarding. Musculoskeletal:         General: No swelling, tenderness or signs of injury. Cervical back: Neck supple. No tenderness. Right lower leg: No edema. Left lower leg: No edema. Feet:      Right foot:      Skin integrity: Skin integrity normal. No ulcer. Left foot:      Skin integrity: Skin integrity normal. No ulcer. Lymphadenopathy:      Cervical: No cervical adenopathy. Skin:     General: Skin is warm and dry. Findings: No erythema or rash. Neurological:      Mental Status: She is alert. Motor: No weakness. Psychiatric:         Mood and Affect: Mood normal.         Behavior: Behavior normal.       Orders Placed This Encounter   Procedures    GLUCOSE MONITOR, 72 HOUR, PHYS INTERP         Current Outpatient Medications   Medication Sig Dispense Refill    HUMALOG KWIKPEN 100 UNIT/ML SOPN Inject 15 Units into the skin 3 times daily (before meals) Take 3 units for every 50 points greater than 150 mg/dL. Take with first bite of food. Max daily dose: 100 units 30 mL 11    TOUJEO MAX SOLOSTAR 300 UNIT/ML SOPN Inject 84 Units into the skin every morning Titrate by 2 units every 3 days to fasting BG goal of  mg/dL. Max Daily Dose: 120 units 39 mL 11    TRULICITY 3 AH/8.5BZ SOPN Inject 3 mg into the skin once a week 2 mL 11    Multiple Vitamin (MULTIVITAMIN) tablet       Continuous Blood Gluc Sensor (DEXCOM G6 SENSOR) MISC Change every 10 days as directed. 9 each 3    Continuous Blood Gluc Transmit (DEXCOM G6 TRANSMITTER) MISC Change every 90 days as directed. 1 each 3    aspirin 81 MG EC tablet The details of the medication are not available because there are pending changes by a home health clinician.       Glucagon (GVOKE HYPOPEN 2-PACK) 1 MG/0.2ML SOAJ Inject 1 mg into the skin as needed      levothyroxine (SYNTHROID) 125 MCG tablet Take 125 mcg by mouth daily      lisinopril-hydroCHLOROthiazide (PRINZIDE;ZESTORETIC)

## 2023-02-27 ENCOUNTER — OFFICE VISIT (OUTPATIENT)
Dept: PRIMARY CARE CLINIC | Facility: CLINIC | Age: 73
End: 2023-02-27
Payer: MEDICARE

## 2023-02-27 VITALS
OXYGEN SATURATION: 97 % | BODY MASS INDEX: 29.29 KG/M2 | DIASTOLIC BLOOD PRESSURE: 66 MMHG | TEMPERATURE: 97.4 F | RESPIRATION RATE: 14 BRPM | SYSTOLIC BLOOD PRESSURE: 126 MMHG | HEART RATE: 93 BPM | WEIGHT: 175.8 LBS | HEIGHT: 65 IN

## 2023-02-27 DIAGNOSIS — E13.9 LADA (LATENT AUTOIMMUNE DIABETES IN ADULTS), MANAGED AS TYPE 2 (HCC): ICD-10-CM

## 2023-02-27 DIAGNOSIS — Z79.899 MEDICATION MANAGEMENT: ICD-10-CM

## 2023-02-27 DIAGNOSIS — Z71.89 ADVANCE CARE PLANNING: ICD-10-CM

## 2023-02-27 DIAGNOSIS — E03.9 ACQUIRED HYPOTHYROIDISM: ICD-10-CM

## 2023-02-27 DIAGNOSIS — E78.2 MIXED HYPERLIPIDEMIA: ICD-10-CM

## 2023-02-27 DIAGNOSIS — E10.65 UNCONTROLLED TYPE 1 DIABETES MELLITUS WITH HYPERGLYCEMIA (HCC): ICD-10-CM

## 2023-02-27 DIAGNOSIS — E10.65 UNCONTROLLED TYPE 1 DIABETES MELLITUS WITH HYPERGLYCEMIA (HCC): Primary | ICD-10-CM

## 2023-02-27 DIAGNOSIS — I11.9 HYPERTENSIVE HEART DISEASE WITHOUT HEART FAILURE: ICD-10-CM

## 2023-02-27 DIAGNOSIS — R06.02 SOB (SHORTNESS OF BREATH): ICD-10-CM

## 2023-02-27 DIAGNOSIS — Z00.00 MEDICARE ANNUAL WELLNESS VISIT, SUBSEQUENT: ICD-10-CM

## 2023-02-27 DIAGNOSIS — E11.319 DIABETIC RETINOPATHY OF BOTH EYES ASSOCIATED WITH TYPE 2 DIABETES MELLITUS, MACULAR EDEMA PRESENCE UNSPECIFIED, UNSPECIFIED RETINOPATHY SEVERITY (HCC): ICD-10-CM

## 2023-02-27 LAB
ALBUMIN SERPL-MCNC: 3.3 G/DL (ref 3.2–4.6)
ALBUMIN/GLOB SERPL: 0.9 (ref 0.4–1.6)
ALP SERPL-CCNC: 106 U/L (ref 50–136)
ALT SERPL-CCNC: 18 U/L (ref 12–65)
ANION GAP SERPL CALC-SCNC: 3 MMOL/L (ref 2–11)
AST SERPL-CCNC: 16 U/L (ref 15–37)
BASOPHILS # BLD: 0 K/UL (ref 0–0.2)
BASOPHILS NFR BLD: 1 % (ref 0–2)
BILIRUB SERPL-MCNC: 0.6 MG/DL (ref 0.2–1.1)
BUN SERPL-MCNC: 21 MG/DL (ref 8–23)
CALCIUM SERPL-MCNC: 9.2 MG/DL (ref 8.3–10.4)
CHLORIDE SERPL-SCNC: 101 MMOL/L (ref 101–110)
CHOLEST SERPL-MCNC: 183 MG/DL
CO2 SERPL-SCNC: 30 MMOL/L (ref 21–32)
CREAT SERPL-MCNC: 0.9 MG/DL (ref 0.6–1)
DIFFERENTIAL METHOD BLD: ABNORMAL
EOSINOPHIL # BLD: 0.1 K/UL (ref 0–0.8)
EOSINOPHIL NFR BLD: 3 % (ref 0.5–7.8)
ERYTHROCYTE [DISTWIDTH] IN BLOOD BY AUTOMATED COUNT: 12.9 % (ref 11.9–14.6)
EST. AVERAGE GLUCOSE BLD GHB EST-MCNC: 289 MG/DL
GLOBULIN SER CALC-MCNC: 3.6 G/DL (ref 2.8–4.5)
GLUCOSE SERPL-MCNC: 464 MG/DL (ref 65–100)
HBA1C MFR BLD: 11.7 % (ref 4.8–5.6)
HCT VFR BLD AUTO: 44.5 % (ref 35.8–46.3)
HDLC SERPL-MCNC: 43 MG/DL (ref 40–60)
HDLC SERPL: 4.3
HGB BLD-MCNC: 14.2 G/DL (ref 11.7–15.4)
IMM GRANULOCYTES # BLD AUTO: 0 K/UL (ref 0–0.5)
IMM GRANULOCYTES NFR BLD AUTO: 0 % (ref 0–5)
LDLC SERPL CALC-MCNC: 80.8 MG/DL
LYMPHOCYTES # BLD: 1.2 K/UL (ref 0.5–4.6)
LYMPHOCYTES NFR BLD: 31 % (ref 13–44)
MCH RBC QN AUTO: 27.8 PG (ref 26.1–32.9)
MCHC RBC AUTO-ENTMCNC: 31.9 G/DL (ref 31.4–35)
MCV RBC AUTO: 87.3 FL (ref 82–102)
MONOCYTES # BLD: 0.2 K/UL (ref 0.1–1.3)
MONOCYTES NFR BLD: 6 % (ref 4–12)
NEUTS SEG # BLD: 2.3 K/UL (ref 1.7–8.2)
NEUTS SEG NFR BLD: 59 % (ref 43–78)
NRBC # BLD: 0 K/UL (ref 0–0.2)
PLATELET # BLD AUTO: 231 K/UL (ref 150–450)
PMV BLD AUTO: 11.3 FL (ref 9.4–12.3)
POTASSIUM SERPL-SCNC: 4.9 MMOL/L (ref 3.5–5.1)
PROT SERPL-MCNC: 6.9 G/DL (ref 6.3–8.2)
RBC # BLD AUTO: 5.1 M/UL (ref 4.05–5.2)
SODIUM SERPL-SCNC: 134 MMOL/L (ref 133–143)
TRIGL SERPL-MCNC: 296 MG/DL (ref 35–150)
TSH, 3RD GENERATION: 5.38 UIU/ML (ref 0.36–3.74)
VLDLC SERPL CALC-MCNC: 59.2 MG/DL (ref 6–23)
WBC # BLD AUTO: 3.9 K/UL (ref 4.3–11.1)

## 2023-02-27 PROCEDURE — 1090F PRES/ABSN URINE INCON ASSESS: CPT | Performed by: FAMILY MEDICINE

## 2023-02-27 PROCEDURE — G0439 PPPS, SUBSEQ VISIT: HCPCS | Performed by: FAMILY MEDICINE

## 2023-02-27 PROCEDURE — 2022F DILAT RTA XM EVC RTNOPTHY: CPT | Performed by: FAMILY MEDICINE

## 2023-02-27 PROCEDURE — 1036F TOBACCO NON-USER: CPT | Performed by: FAMILY MEDICINE

## 2023-02-27 PROCEDURE — 3017F COLORECTAL CA SCREEN DOC REV: CPT | Performed by: FAMILY MEDICINE

## 2023-02-27 PROCEDURE — 3046F HEMOGLOBIN A1C LEVEL >9.0%: CPT | Performed by: FAMILY MEDICINE

## 2023-02-27 PROCEDURE — 3074F SYST BP LT 130 MM HG: CPT | Performed by: FAMILY MEDICINE

## 2023-02-27 PROCEDURE — G8400 PT W/DXA NO RESULTS DOC: HCPCS | Performed by: FAMILY MEDICINE

## 2023-02-27 PROCEDURE — 3078F DIAST BP <80 MM HG: CPT | Performed by: FAMILY MEDICINE

## 2023-02-27 PROCEDURE — 1123F ACP DISCUSS/DSCN MKR DOCD: CPT | Performed by: FAMILY MEDICINE

## 2023-02-27 PROCEDURE — G8484 FLU IMMUNIZE NO ADMIN: HCPCS | Performed by: FAMILY MEDICINE

## 2023-02-27 PROCEDURE — G8427 DOCREV CUR MEDS BY ELIG CLIN: HCPCS | Performed by: FAMILY MEDICINE

## 2023-02-27 PROCEDURE — G8417 CALC BMI ABV UP PARAM F/U: HCPCS | Performed by: FAMILY MEDICINE

## 2023-02-27 PROCEDURE — 99213 OFFICE O/P EST LOW 20 MIN: CPT | Performed by: FAMILY MEDICINE

## 2023-02-27 RX ORDER — MULTIVITAMIN WITH FOLIC ACID 400 MCG
TABLET ORAL
Qty: 100 TABLET | Refills: 5 | Status: SHIPPED | OUTPATIENT
Start: 2023-02-27

## 2023-02-27 RX ORDER — INSULIN LISPRO 100 [IU]/ML
15 INJECTION, SOLUTION INTRAVENOUS; SUBCUTANEOUS
Qty: 30 ML | Refills: 11 | Status: SHIPPED | OUTPATIENT
Start: 2023-02-27

## 2023-02-27 RX ORDER — SIMVASTATIN 40 MG
40 TABLET ORAL NIGHTLY
Qty: 90 TABLET | Refills: 5 | Status: SHIPPED | OUTPATIENT
Start: 2023-02-27

## 2023-02-27 RX ORDER — DULAGLUTIDE 3 MG/.5ML
3 INJECTION, SOLUTION SUBCUTANEOUS WEEKLY
Qty: 2 ML | Refills: 11 | Status: SHIPPED | OUTPATIENT
Start: 2023-02-27 | End: 2023-02-27 | Stop reason: SDUPTHER

## 2023-02-27 RX ORDER — LISINOPRIL AND HYDROCHLOROTHIAZIDE 20; 12.5 MG/1; MG/1
1 TABLET ORAL DAILY
Qty: 90 TABLET | Refills: 5 | Status: SHIPPED | OUTPATIENT
Start: 2023-02-27

## 2023-02-27 RX ORDER — LEVOTHYROXINE SODIUM 0.12 MG/1
125 TABLET ORAL DAILY
Qty: 90 TABLET | Refills: 5 | Status: SHIPPED | OUTPATIENT
Start: 2023-02-27

## 2023-02-27 RX ORDER — METOPROLOL SUCCINATE 50 MG/1
50 TABLET, EXTENDED RELEASE ORAL DAILY
Qty: 90 TABLET | Refills: 5 | Status: SHIPPED | OUTPATIENT
Start: 2023-02-27

## 2023-02-27 RX ORDER — DULAGLUTIDE 3 MG/.5ML
3 INJECTION, SOLUTION SUBCUTANEOUS WEEKLY
Qty: 2 ML | Refills: 11 | Status: SHIPPED | OUTPATIENT
Start: 2023-02-27

## 2023-02-27 RX ORDER — DONEPEZIL HYDROCHLORIDE 5 MG/1
5 TABLET, FILM COATED ORAL NIGHTLY
Qty: 90 TABLET | Refills: 2 | Status: SHIPPED | OUTPATIENT
Start: 2023-02-27

## 2023-02-27 SDOH — ECONOMIC STABILITY: FOOD INSECURITY: WITHIN THE PAST 12 MONTHS, YOU WORRIED THAT YOUR FOOD WOULD RUN OUT BEFORE YOU GOT MONEY TO BUY MORE.: NEVER TRUE

## 2023-02-27 SDOH — ECONOMIC STABILITY: FOOD INSECURITY: WITHIN THE PAST 12 MONTHS, THE FOOD YOU BOUGHT JUST DIDN'T LAST AND YOU DIDN'T HAVE MONEY TO GET MORE.: NEVER TRUE

## 2023-02-27 SDOH — ECONOMIC STABILITY: HOUSING INSECURITY
IN THE LAST 12 MONTHS, WAS THERE A TIME WHEN YOU DID NOT HAVE A STEADY PLACE TO SLEEP OR SLEPT IN A SHELTER (INCLUDING NOW)?: NO

## 2023-02-27 SDOH — ECONOMIC STABILITY: INCOME INSECURITY: HOW HARD IS IT FOR YOU TO PAY FOR THE VERY BASICS LIKE FOOD, HOUSING, MEDICAL CARE, AND HEATING?: NOT HARD AT ALL

## 2023-02-27 ASSESSMENT — ENCOUNTER SYMPTOMS
TROUBLE SWALLOWING: 0
EYES NEGATIVE: 1
VOICE CHANGE: 0
EYE REDNESS: 0
DIARRHEA: 0
WHEEZING: 0
EYE PAIN: 0
CHOKING: 0
BACK PAIN: 0
COLOR CHANGE: 0
SORE THROAT: 0
BLOOD IN STOOL: 0
CONSTIPATION: 0
SINUS PAIN: 0
ABDOMINAL PAIN: 0
RESPIRATORY NEGATIVE: 1
CHEST TIGHTNESS: 0
PHOTOPHOBIA: 0
SINUS PRESSURE: 0
RHINORRHEA: 0
ABDOMINAL DISTENTION: 0
NAUSEA: 0
EYE DISCHARGE: 0
SHORTNESS OF BREATH: 0
VOMITING: 0
COUGH: 0

## 2023-02-27 ASSESSMENT — LIFESTYLE VARIABLES
HOW OFTEN DO YOU HAVE A DRINK CONTAINING ALCOHOL: NEVER
HOW MANY STANDARD DRINKS CONTAINING ALCOHOL DO YOU HAVE ON A TYPICAL DAY: PATIENT DOES NOT DRINK

## 2023-02-27 ASSESSMENT — PATIENT HEALTH QUESTIONNAIRE - PHQ9
SUM OF ALL RESPONSES TO PHQ QUESTIONS 1-9: 0
1. LITTLE INTEREST OR PLEASURE IN DOING THINGS: 0
SUM OF ALL RESPONSES TO PHQ QUESTIONS 1-9: 0
SUM OF ALL RESPONSES TO PHQ9 QUESTIONS 1 & 2: 0
2. FEELING DOWN, DEPRESSED OR HOPELESS: 0

## 2023-02-27 NOTE — PATIENT INSTRUCTIONS
Preventing Falls: Care Instructions  Overview     Getting around your home safely can be a challenge if you have injuries or health problems that make it easy for you to fall. Loose rugs and furniture in walkways are among the dangers for many older people who have problems walking or who have poor eyesight. People who have conditions such as arthritis, osteoporosis, or dementia also have to be careful not to fall. You can make your home safer with a few simple measures. Follow-up care is a key part of your treatment and safety. Be sure to make and go to all appointments, and call your doctor if you are having problems. It's also a good idea to know your test results and keep a list of the medicines you take. How can you care for yourself at home? Taking care of yourself  Exercise regularly to improve your strength, muscle tone, and balance. Walk if you can. Swimming may be a good choice if you cannot walk easily. Have your vision and hearing checked each year or any time you notice a change. If you have trouble seeing and hearing, you might not be able to avoid objects and could lose your balance. Know the side effects of the medicines you take. Ask your doctor or pharmacist whether the medicines you take can affect your balance. Sleeping pills or sedatives can affect your balance. Limit the amount of alcohol you drink. Alcohol can impair your balance and other senses. Ask your doctor whether calluses or corns on your feet need to be removed. If you wear loose-fitting shoes because of calluses or corns, you can lose your balance and fall. Talk to your doctor if you have numbness in your feet. You may get dizzy if you do not drink enough water. To prevent dehydration, drink plenty of fluids. Choose water and other clear liquids. If you have kidney, heart, or liver disease and have to limit fluids, talk with your doctor before you increase the amount of fluids you drink.   Preventing falls at home  Remove raised doorway thresholds, throw rugs, and clutter. Repair loose carpet or raised areas in the floor. Move furniture and electrical cords to keep them out of walking paths. Use nonskid floor wax, and wipe up spills right away, especially on ceramic tile floors. If you use a walker or cane, put rubber tips on it. If you use crutches, clean the bottoms of them regularly with an abrasive pad, such as steel wool. Keep your house well lit, especially stairways, porches, and outside walkways. Use night-lights in areas such as hallways and bathrooms. Add extra light switches or use remote switches (such as switches that go on or off when you clap your hands) to make it easier to turn lights on if you have to get up during the night. Install sturdy handrails on stairways. Move items in your cabinets so that the things you use a lot are on the lower shelves (about waist level). Keep a cordless phone and a flashlight with new batteries by your bed. If possible, put a phone in each of the main rooms of your house, or carry a cell phone in case you fall and cannot reach a phone. Or, you can wear a device around your neck or wrist. You push a button that sends a signal for help. Wear low-heeled shoes that fit well and give your feet good support. Use footwear with nonskid soles. Check the heels and soles of your shoes for wear. Repair or replace worn heels or soles. Do not wear socks without shoes on smooth floors, such as wood. Walk on the grass when the sidewalks are slippery. If you live in an area that gets snow and ice in the winter, sprinkle salt on slippery steps and sidewalks. Or ask a family member or friend to do this for you. Preventing falls in the bath  Install grab bars and nonskid mats inside and outside your shower or tub and near the toilet and sinks. Use shower chairs and bath benches. Use a hand-held shower head that will allow you to sit while showering.   Get into a tub or shower by putting the weaker leg in first. Get out of a tub or shower with your strong side first.  Repair loose toilet seats and consider installing a raised toilet seat to make getting on and off the toilet easier. Keep your bathroom door unlocked while you are in the shower. Where can you learn more? Go to http://www.jarvis.com/ and enter G117 to learn more about \"Preventing Falls: Care Instructions. \"  Current as of: May 4, 2022               Content Version: 13.5  © 0762-3395 Healthwise, [employer]. Care instructions adapted under license by [facility] ([facility]). If you have questions about a medical condition or this instruction, always ask your healthcare professional. [address] any warranty or liability for your use of this information. Hearing Loss: Care Instructions  Overview     Hearing loss is a sudden or slow decrease in how well you hear. It can range from mild to severe. Permanent hearing loss can occur with aging. It also can happen when you are exposed long-term to loud noise. Examples include listening to loud music, riding motorcycles, or being around other loud machines. Hearing loss can affect your work and home life. It can make you feel lonely or depressed. You may feel that you have lost your independence. But hearing aids and other devices can help you hear better and feel connected to others. Follow-up care is a key part of your treatment and safety. Be sure to make and go to all appointments, and call your doctor if you are having problems. It's also a good idea to know your test results and keep a list of the medicines you take. How can you care for yourself at home? Avoid loud noises whenever possible. This helps keep your hearing from getting worse. Always wear hearing protection around loud noises. Wear a hearing aid as directed. See a professional who can help you pick a hearing aid that fits you. Have hearing tests as your doctor suggests. They can show whether your hearing has changed. Your hearing aid may need to be adjusted. Use other devices as needed. These may include:  Telephone amplifiers and hearing aids that can connect to a television, stereo, radio, or microphone. Devices that use lights or vibrations. These alert you to the doorbell, a ringing telephone, or a baby monitor. Television closed-captioning. This shows the words at the bottom of the screen. Most new TVs can do this. TTY (text telephone). This lets you type messages back and forth on the telephone instead of talking or listening. These devices are also called TDD. When messages are typed on the keyboard, they are sent over the phone line to a receiving TTY. The message is shown on a monitor. Use text messaging, social media, and email if it is hard for you to communicate by telephone. Try to learn a listening technique called speechreading. It is not lipreading. You pay attention to people's gestures, expressions, posture, and tone of voice. These clues can help you understand what a person is saying. Face the person you are talking to, and have them face you. Make sure the lighting is good. You need to see the other person's face clearly. Think about counseling if you need help to adjust to your hearing loss. When should you call for help? Watch closely for changes in your health, and be sure to contact your doctor if:    You think your hearing is getting worse.     You have new symptoms, such as dizziness or nausea. Where can you learn more? Go to http://www.[employer].com/ and enter R798 to learn more about \"Hearing Loss: Care Instructions. \"  Current as of: May 4, 2022               Content Version: 13.5  © 7976-3112 Healthwise, Incorporated. Care instructions adapted under license by [facility] ([facility]).  If you have questions about a medical condition or this instruction, always ask your healthcare professional. Mago Narvaez any warranty or liability for your use of this information. Advance Directives: Care Instructions  Overview  An advance directive is a legal way to state your wishes at the end of your life. It tells your family and your doctor what to do if you can't say what you want. There are two main types of advance directives. You can change them any time your wishes change. Living will. This form tells your family and your doctor your wishes about life support and other treatment. The form is also called a declaration. Medical power of . This form lets you name a person to make treatment decisions for you when you can't speak for yourself. This person is called a health care agent (health care proxy, health care surrogate). The form is also called a durable power of  for health care. If you do not have an advance directive, decisions about your medical care may be made by a family member, or by a doctor or a  who doesn't know you. It may help to think of an advance directive as a gift to the people who care for you. If you have one, they won't have to make tough decisions by themselves. For more information, including forms for your state, see the [address] website (www.caringinfo.org/planning/advance-directives/). Follow-up care is a key part of your treatment and safety. Be sure to make and go to all appointments, and call your doctor if you are having problems. It's also a good idea to know your test results and keep a list of the medicines you take. What should you include in an advance directive? Many states have a unique advance directive form. (It may ask you to address specific issues.) Or you might use a universal form that's approved by many states. If your form doesn't tell you what to address, it may be hard to know what to include in your advance directive. Use the questions below to help you get started.   Who do you want to make decisions about your medical care if you are not able to? What life-support measures do you want if you have a serious illness that gets worse over time or can't be cured? What are you most afraid of that might happen? (Maybe you're afraid of having pain, losing your independence, or being kept alive by machines.)  Where would you prefer to die? (Your home? A hospital? A nursing home?)  Do you want to donate your organs when you die? Do you want certain Islam practices performed before you die? When should you call for help? Be sure to contact your doctor if you have any questions. Where can you learn more? Go to http://www.jarvis.com/ and enter R264 to learn more about \"Advance Directives: Care Instructions. \"  Current as of: June 16, 2022               Content Version: 13.5  © 7322-3024 Healthwise, [employer]. Care instructions adapted under license by [facility] ([facility]). If you have questions about a medical condition or this instruction, always ask your healthcare professional. [place] [address] any warranty or liability for your use of this information. A Healthy Heart: Care Instructions  Your Care Instructions     Coronary artery disease, also called heart disease, occurs when a substance called plaque builds up in the vessels that supply oxygen-rich blood to your heart muscle. This can narrow the blood vessels and reduce blood flow. A heart attack happens when blood flow is completely blocked. A high-fat diet, smoking, and other factors increase the risk of heart disease. Your doctor has found that you have a chance of having heart disease. You can do lots of things to keep your heart healthy. It may not be easy, but you can change your diet, exercise more, and quit smoking. These steps really work to lower your chance of heart disease. Follow-up care is a key part of your treatment and safety. Be sure to make and go to all appointments, and call your doctor if you are having problems.  It's also a good idea to know your test results and keep a list of the medicines you take. How can you care for yourself at home? Diet    Use less salt when you cook and eat. This helps lower your blood pressure. Taste food before salting. Add only a little salt when you think you need it. With time, your taste buds will adjust to less salt.     Eat fewer snack items, fast foods, canned soups, and other high-salt, high-fat, processed foods.     Read food labels and try to avoid saturated and trans fats. They increase your risk of heart disease by raising cholesterol levels.     Limit the amount of solid fat-butter, margarine, and shortening-you eat. Use olive, peanut, or canola oil when you cook. Bake, broil, and steam foods instead of frying them.     Eat a variety of fruit and vegetables every day. Dark green, deep orange, red, or yellow fruits and vegetables are especially good for you. Examples include spinach, carrots, peaches, and berries.     Foods high in fiber can reduce your cholesterol and provide important vitamins and minerals. High-fiber foods include whole-grain cereals and breads, oatmeal, beans, brown rice, citrus fruits, and apples.     Eat lean proteins. Heart-healthy proteins include seafood, lean meats and poultry, eggs, beans, peas, nuts, seeds, and soy products.     Limit drinks and foods with added sugar. These include candy, desserts, and soda pop. Lifestyle changes    If your doctor recommends it, get more exercise. Walking is a good choice. Bit by bit, increase the amount you walk every day. Try for at least 30 minutes on most days of the week. You also may want to swim, bike, or do other activities.     Do not smoke. If you need help quitting, talk to your doctor about stop-smoking programs and medicines. These can increase your chances of quitting for good. Quitting smoking may be the most important step you can take to protect your heart.  It is never too late to quit.     Limit alcohol to 2 drinks a day for men and 1 drink a day for women. Too much alcohol can cause health problems.     Manage other health problems such as diabetes, high blood pressure, and high cholesterol. If you think you may have a problem with alcohol or drug use, talk to your doctor.   Medicines    Take your medicines exactly as prescribed. Call your doctor if you think you are having a problem with your medicine.     If your doctor recommends aspirin, take the amount directed each day. Make sure you take aspirin and not another kind of pain reliever, such as acetaminophen (Tylenol).   When should you call for help?   Call 911 if you have symptoms of a heart attack. These may include:    Chest pain or pressure, or a strange feeling in the chest.     Sweating.     Shortness of breath.     Pain, pressure, or a strange feeling in the back, neck, jaw, or upper belly or in one or both shoulders or arms.     Lightheadedness or sudden weakness.     A fast or irregular heartbeat.   After you call 911, the  may tell you to chew 1 adult-strength or 2 to 4 low-dose aspirin. Wait for an ambulance. Do not try to drive yourself.  Watch closely for changes in your health, and be sure to contact your doctor if you have any problems.  Where can you learn more?  Go to https://www.Sosei.net/patientEd and enter F075 to learn more about \"A Healthy Heart: Care Instructions.\"  Current as of: September 7, 2022               Content Version: 13.5  © 0195-0410 [employer].   Care instructions adapted under license by [employer]. If you have questions about a medical condition or this instruction, always ask your healthcare professional. [employer] disclaims any warranty or liability for your use of this information.      Personalized Preventive Plan for Christina Goncalves - 2/27/2023  Medicare offers a range of preventive health benefits. Some of the tests and screenings are paid in full while other may be subject to a  deductible, co-insurance, and/or copay. Some of these benefits include a comprehensive review of your medical history including lifestyle, illnesses that may run in your family, and various assessments and screenings as appropriate. After reviewing your medical record and screening and assessments performed today your provider may have ordered immunizations, labs, imaging, and/or referrals for you. A list of these orders (if applicable) as well as your Preventive Care list are included within your After Visit Summary for your review. Other Preventive Recommendations:    A preventive eye exam performed by an eye specialist is recommended every 1-2 years to screen for glaucoma; cataracts, macular degeneration, and other eye disorders. A preventive dental visit is recommended every 6 months. Try to get at least 150 minutes of exercise per week or 10,000 steps per day on a pedometer . Order or download the FREE \"Exercise & Physical Activity: Your Everyday Guide\" from [employer]. Call 8-671.384.2890 or search [employer] online. You need 8297-2652 mg of calcium and 6604-6716 IU of vitamin D per day. It is possible to meet your calcium requirement with diet alone, but a vitamin D supplement is usually necessary to meet this goal.  When exposed to the sun, use a sunscreen that protects against both UVA and UVB radiation with an SPF of 30 or greater. Reapply every 2 to 3 hours or after sweating, drying off with a towel, or swimming. Always wear a seat belt when traveling in a car. Always wear a helmet when riding a bicycle or motorcycle.

## 2023-02-27 NOTE — PROGRESS NOTES
Here for follow-up and wound wellness exam for numerous medical problems. She has poorly controlled diabetes last hemoglobin A1c over 12. She came with her . He tells me that he takes she takes 84 units of long-acting insulin glargine at bedtime and then 12 units with each meals of regular insulin. I am not sure what the reason of her poorly controlled diabetes in spite of rather massive doses of insulin and discussed risk of hypoglycemia. She will definitely benefit from management by endocrinology. Her C-peptide was very low suggestive of type I or insulin deficiency diabetes. She has mild memory impairment no change in functional status no fall injuries. Her blood pressure controlled no chest pain angina shortness of breath. Baseline shortness of breath. She is up-to-date with immunization. She lives with her . Apparently physician that was managing her diabetes living. No smoking alcohol or drug abuse. Mild memory impairment stable. Vision screen normal.  No contributory family history positive for diabetes hypertension    Review of Systems   Constitutional:  Negative for activity change, appetite change, chills, diaphoresis, fatigue, fever and unexpected weight change. HENT: Negative. Negative for congestion, dental problem, ear pain, hearing loss, nosebleeds, rhinorrhea, sinus pressure, sinus pain, sore throat, trouble swallowing and voice change. Eyes: Negative. Negative for photophobia, pain, discharge, redness and visual disturbance. Respiratory: Negative. Negative for cough, choking, chest tightness, shortness of breath and wheezing. Cardiovascular: Negative. Negative for chest pain, palpitations and leg swelling. Gastrointestinal:  Negative for abdominal distention, abdominal pain, blood in stool, constipation, diarrhea, nausea and vomiting. Endocrine: Negative. Negative for polydipsia, polyphagia and polyuria. Genitourinary: Negative.   Negative for difficulty urinating, dysuria, frequency, genital sores, hematuria, urgency and vaginal bleeding. Musculoskeletal: Negative. Negative for arthralgias, back pain, gait problem, joint swelling, myalgias and neck pain. Skin: Negative. Negative for color change and rash. Allergic/Immunologic: Negative for environmental allergies and food allergies. Neurological:  Positive for headaches. Negative for dizziness, tremors, seizures, syncope, speech difficulty, weakness and numbness. Hematological:  Negative for adenopathy. Does not bruise/bleed easily. Psychiatric/Behavioral:  Negative for agitation, behavioral problems, confusion, decreased concentration, dysphoric mood, hallucinations, self-injury, sleep disturbance and suicidal ideas. The patient is not nervous/anxious. No change in functional status memory      Physical Exam  Vitals and nursing note reviewed. Exam conducted with a chaperone present. Constitutional:       General: She is not in acute distress. Appearance: Normal appearance. She is obese. She is not ill-appearing or toxic-appearing. HENT:      Head: Normocephalic and atraumatic. Right Ear: External ear normal.      Left Ear: External ear normal.      Nose: Nose normal. No congestion or rhinorrhea. Mouth/Throat:      Mouth: Mucous membranes are moist.      Pharynx: No oropharyngeal exudate. Eyes:      General: No scleral icterus. Right eye: No discharge. Left eye: No discharge. Extraocular Movements: Extraocular movements intact. Conjunctiva/sclera: Conjunctivae normal.      Pupils: Pupils are equal, round, and reactive to light. Cardiovascular:      Rate and Rhythm: Normal rate and regular rhythm. Pulses: Normal pulses. Heart sounds: Normal heart sounds. No murmur heard. No gallop. Pulmonary:      Effort: Pulmonary effort is normal. No respiratory distress. Breath sounds: Normal breath sounds. No stridor.  No wheezing, rhonchi or rales. Chest:      Chest wall: No tenderness. Abdominal:      General: Abdomen is flat. There is no distension. Palpations: Abdomen is soft. There is no mass. Tenderness: There is no abdominal tenderness. There is no right CVA tenderness, guarding or rebound. Hernia: No hernia is present. Genitourinary:     Vagina: No vaginal discharge. Musculoskeletal:         General: No swelling, tenderness, deformity or signs of injury. Normal range of motion. Cervical back: Normal range of motion and neck supple. No rigidity. Right lower leg: No edema. Left lower leg: No edema. Lymphadenopathy:      Cervical: No cervical adenopathy. Skin:     General: Skin is warm. Capillary Refill: Capillary refill takes less than 2 seconds. Coloration: Skin is not jaundiced or pale. Findings: No bruising, erythema, lesion or rash. Neurological:      General: No focal deficit present. Mental Status: She is alert and oriented to person, place, and time. Mental status is at baseline. Cranial Nerves: No cranial nerve deficit. Motor: No weakness. Coordination: Coordination normal.      Gait: Gait abnormal.   Psychiatric:         Mood and Affect: Mood normal.         Behavior: Behavior normal.      Comments: Mini cognitive test mildly abnormal.  She is oriented to time place person. 1. Uncontrolled type 1 diabetes mellitus with hyperglycemia (HCC)  Seems to type 1 diabetes with very low C-peptide. Will benefit from follow-up with endocrinologist management diabetes she is on very high doses long-acting insulin glargine 82year-old cycle 2 has been and with mealtime insulin. Last hemoglobin A1c was over 12.  - Comprehensive Metabolic Panel; Future  - Hemoglobin A1C; Future    2. Hypertensive heart disease without heart failure  Discussed risk factor management for coronary artery disease refill on medications    3.  Diabetic retinopathy of both eyes associated with type 2 diabetes mellitus, macular edema presence unspecified, unspecified retinopathy severity ([facility]  Medication reviewed discussed    4. SOB (shortness of breath)  This is improved  - CBC with Auto Differential; Future    5. Medication management  Statins,  cholesterol lowering agents, simvastatin Lipitor pravastatin, has unequivocal evidence of decreased heart attack strokes, long-term benefits,  with very little risks,  side effects, in spite of all the  the negative publicity, strongly recommended, can reduce dose to half pill , not stop. If diabetic and CKD benefit of taking statins are profound, irrespective of baseline LDL , even if less than 70. High intensity statin therapy is recommended inpatient with stable coronary artery disease history, irrespective of LDL level by [employer] heart association [employer] of cardiology    - CBC with Auto Differential; Future  - Comprehensive Metabolic Panel; Future  - TSH; Future    6. Mixed hyperlipidemia  Low-cholesterol diet  - Lipid Panel; Future    7. 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 Take with first bite of food. Max daily dose: 100 units  Dispense: 30 mL; Refill: 11    8. Acquired hypothyroidism  Recheck TSH       Bhavani Barroso MD Medicare Annual Wellness Visit    Kristie Coxwilfredo is here for Medicare AWV and Medication Refill    Assessment & Plan   Uncontrolled type 1 diabetes mellitus with hyperglycemia ([facility]  -     Comprehensive Metabolic Panel;  Future  -     Hemoglobin A1C; Future  Hypertensive heart disease without heart failure  Diabetic retinopathy of both eyes associated with type 2 diabetes mellitus, macular edema presence unspecified, unspecified retinopathy severity (HCC)  SOB (shortness of breath)  -     CBC with Auto Differential; Future  Medication management  -     CBC with Auto Differential; Future  -     Comprehensive Metabolic Panel; Future  -     TSH; Future  Mixed hyperlipidemia  -     Lipid Panel; Future  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 Take with first bite of food. Max daily dose: 100 units, Disp-30 mL, R-11, DAWNormal  Acquired hypothyroidism  Medicare annual wellness visit, subsequent      Recommendations for Preventive Services Due: see orders and patient instructions/AVS.  Recommended screening schedule for the next 5-10 years is provided to the patient in written form: see Patient Instructions/AVS.     Return for Medicare Annual Wellness Visit in 1 year. Subjective   The following acute and/or chronic problems were also addressed today:  Denies any acute pains change in functional status fall injuries    Patient's complete Health Risk Assessment and screening values have been reviewed and are found in Flowsheets. The following problems were reviewed today and where indicated follow up appointments were made and/or referrals ordered.     Positive Risk Factor Screenings with Interventions:    Fall Risk:  Do you feel unsteady or are you worried about falling? : (!) yes  2 or more falls in past year?: no  Fall with injury in past year?: no     Interventions:    Discussed fall precautions                                    Objective   Vitals:    02/27/23 1021   BP: 126/66   Site: Right Upper Arm   Position: Sitting   Cuff Size: Medium Adult Pulse: 93   Resp: 14   Temp: 97.4 °F (36.3 °C)   TempSrc: Tympanic   SpO2: 97%   Weight: 175 lb 12.8 oz (79.7 kg)   Height: 5' 5\" (1.651 m)      Body mass index is 29.25 kg/m². No Known Allergies  Prior to Visit Medications    Medication Sig Taking? Authorizing Provider   [facility]/6.[facility]HF SOPN Inject 3 mg into the skin once a week Yes Miguelangel Denny MD   HUMALOG KWIKPEN 100 UNIT/ML SOPN Inject 15 Units into the skin 3 times daily (before meals) Take 3 units for every 50 points greater than 150 mg/dL. Take with first bite of food. Max daily dose: 100 units Yes Miguelangel Denny MD   donepezil (ARICEPT) 5 MG tablet Take 1 tablet by mouth nightly Yes Judy Nava MD   levothyroxine (SYNTHROID) 125 MCG tablet Take 1 tablet by mouth daily Yes Judy Nava MD   lisinopril-hydroCHLOROthiazide (PRINZIDE;ZESTORETIC) 20-12.5 MG per tablet Take 1 tablet by mouth daily Yes Judy Nava MD   metoprolol succinate (TOPROL XL) 50 MG extended release tablet Take 1 tablet by mouth daily Yes Miguelangel Denny MD   simvastatin (ZOCOR) 40 MG tablet Take 1 tablet by mouth nightly Yes Judy Nava MD   Multiple Vitamin (MULTIVITAMIN) tablet Once daily OTC Yes Miguelangel Denny MD   TOUJEO MAX SOLOSTAR 300 UNIT/ML SOPN Inject 84 Units into the skin every morning Titrate by 2 units every 3 days to fasting BG goal of  mg/dL. Max Daily Dose: 120 units Yes TARUN Bloes NP   Continuous Blood Gluc Sensor (DEXCOM G6 SENSOR) MISC Change every 10 days as directed. Yes TARUN Corbett NP   Continuous Blood Gluc Transmit (DEXCOM G6 TRANSMITTER) MISC Change every 90 days as directed. Yes TARUN Corbett NP   aspirin 81 MG EC tablet The details of the medication are not available because there are pending changes by a home health clinician.  Yes Ar Automatic Reconciliation   Glucagon (GVOKE HYPOPEN 2-PACK) 1 MG/0.2ML SOAJ Inject 1 mg into the skin as needed Yes Ar Automatic Reconciliation       CareTeam (Including outside providers/suppliers regularly involved in providing care):   Patient Care Team:  Snehal Gamboa MD as PCP - William Lauren MD as PCP - Empaneled Provider     Reviewed and updated this visit:  Tobacco  Allergies  Meds  Med Hx  Surg Hx  Soc Hx  Fam Hx      Once a day multivitamin recommended healthcare directives advance planning papers.   Recheck after lab test.  Abnormal gait using walking stick       Rafael Hay MD

## 2023-02-28 ENCOUNTER — CLINICAL DOCUMENTATION (OUTPATIENT)
Dept: CARE COORDINATION | Facility: CLINIC | Age: 73
End: 2023-02-28

## 2023-02-28 RX ORDER — METOPROLOL SUCCINATE 50 MG/1
TABLET, EXTENDED RELEASE ORAL
Qty: 90 TABLET | Refills: 3 | OUTPATIENT
Start: 2023-02-28

## 2023-02-28 NOTE — Clinical Note
Thank you for the referral. Spoke with patients spouse who agrees to ACP conversation. Will mail info packet and follow next week for scheduling.

## 2023-02-28 NOTE — ACP (ADVANCE CARE PLANNING)
Advance Care Planning   Ambulatory ACP Specialist Patient Outreach    Date:  2/28/2023  ACP Specialist:  Santana Claudio    Outreach call to patient in follow-up to ACP Specialist referral from: Morelia Alvarez MD    [x] PCP  [x] Provider   [] Ambulatory Care Management [] Other for Reason:    [x] Advance Directive Assistance  [] Code Status Discussion  [] Complete Portable DNR Order  [x] Discuss Goals of Care  [] Complete POST/MOST  [x] Early ACP Decision-Making  [x] Other    Date Referral Received:2/27/2023    Today's Outreach:  [x] First   [] Second  [] Third                               Third outreach made by []  phone  [] email []   [employer]     Intervention:  [x] Spoke with Patient  [] Left VM requesting return call      Outcome:Contacted the patient to offer ACP conversation. Spoke with patients spouse who agrees to have ACP packet mailed for both and ACP-S will follow next Wednesday for scheduling. Next Step:   [] ACP scheduled conversation  [x] Outreach again in one week               [x] Email / Mail ACP Info Sheets  [x] Email / Mail Advance Directive            [] Close Referral. Routing closure to referring provider/staff and to ACP Specialist . [] Closure Letter mailed to Patient with Invitation to Contact ACP Specialist if/when ready.     Thank you for this referral.

## 2023-03-03 ENCOUNTER — TELEPHONE (OUTPATIENT)
Dept: PRIMARY CARE CLINIC | Facility: CLINIC | Age: 73
End: 2023-03-03

## 2023-03-03 NOTE — TELEPHONE ENCOUNTER
Called patient to go over her lab results, but there was no answer. And I was not able to leave a VM, due to her not having her VM set up.

## 2023-03-03 NOTE — TELEPHONE ENCOUNTER
----- Message from Kelly Green MD sent at 2/28/2023 10:33 AM EST -----  Very poorly controlled diabetes, type I.  Endocrinology follow-up as soon as possible medications as directed.   White cell

## 2023-03-22 ENCOUNTER — CLINICAL DOCUMENTATION (OUTPATIENT)
Dept: CARE COORDINATION | Facility: CLINIC | Age: 73
End: 2023-03-22

## 2023-03-30 ENCOUNTER — CLINICAL DOCUMENTATION (OUTPATIENT)
Dept: CARE COORDINATION | Facility: CLINIC | Age: 73
End: 2023-03-30

## 2023-04-25 ENCOUNTER — CLINICAL DOCUMENTATION (OUTPATIENT)
Dept: CARE COORDINATION | Facility: CLINIC | Age: 73
End: 2023-04-25

## 2023-06-23 ENCOUNTER — TELEPHONE (OUTPATIENT)
Dept: PRIMARY CARE CLINIC | Facility: CLINIC | Age: 73
End: 2023-06-23

## 2023-06-23 NOTE — TELEPHONE ENCOUNTER
Requesting additional days of physical therapy  [phone] Sharee Johnson physical therapist Interim Brad Garcia

## 2023-06-28 ENCOUNTER — OFFICE VISIT (OUTPATIENT)
Dept: PRIMARY CARE CLINIC | Facility: CLINIC | Age: 73
End: 2023-06-28
Payer: MEDICARE

## 2023-06-28 VITALS — RESPIRATION RATE: 15 BRPM | BODY MASS INDEX: 29.25 KG/M2 | TEMPERATURE: 98.3 F | HEIGHT: 65 IN

## 2023-06-28 DIAGNOSIS — Z86.73 HISTORY OF CVA (CEREBROVASCULAR ACCIDENT): ICD-10-CM

## 2023-06-28 DIAGNOSIS — R41.3: ICD-10-CM

## 2023-06-28 DIAGNOSIS — G30.9 ALZHEIMER'S DISEASE, UNSPECIFIED (CODE) (HCC): ICD-10-CM

## 2023-06-28 DIAGNOSIS — N39.0 URINARY TRACT INFECTION WITHOUT HEMATURIA, SITE UNSPECIFIED: ICD-10-CM

## 2023-06-28 DIAGNOSIS — I10 ESSENTIAL HYPERTENSION: ICD-10-CM

## 2023-06-28 DIAGNOSIS — E11.65 TYPE 2 DIABETES MELLITUS WITH HYPERGLYCEMIA, WITH LONG-TERM CURRENT USE OF INSULIN (HCC): ICD-10-CM

## 2023-06-28 DIAGNOSIS — Z79.4 TYPE 2 DIABETES MELLITUS WITH HYPERGLYCEMIA, WITH LONG-TERM CURRENT USE OF INSULIN (HCC): ICD-10-CM

## 2023-06-28 DIAGNOSIS — Z09 HOSPITAL DISCHARGE FOLLOW-UP: Primary | ICD-10-CM

## 2023-06-28 PROBLEM — R80.9 MICROALBUMINURIA: Status: RESOLVED | Noted: 2022-02-09 | Resolved: 2023-06-28

## 2023-06-28 PROCEDURE — 1111F DSCHRG MED/CURRENT MED MERGE: CPT | Performed by: FAMILY MEDICINE

## 2023-06-28 PROCEDURE — 3046F HEMOGLOBIN A1C LEVEL >9.0%: CPT | Performed by: FAMILY MEDICINE

## 2023-06-28 PROCEDURE — G8417 CALC BMI ABV UP PARAM F/U: HCPCS | Performed by: FAMILY MEDICINE

## 2023-06-28 PROCEDURE — 1036F TOBACCO NON-USER: CPT | Performed by: FAMILY MEDICINE

## 2023-06-28 PROCEDURE — G8400 PT W/DXA NO RESULTS DOC: HCPCS | Performed by: FAMILY MEDICINE

## 2023-06-28 PROCEDURE — 99214 OFFICE O/P EST MOD 30 MIN: CPT | Performed by: FAMILY MEDICINE

## 2023-06-28 PROCEDURE — 1090F PRES/ABSN URINE INCON ASSESS: CPT | Performed by: FAMILY MEDICINE

## 2023-06-28 PROCEDURE — 1123F ACP DISCUSS/DSCN MKR DOCD: CPT | Performed by: FAMILY MEDICINE

## 2023-06-28 PROCEDURE — G8427 DOCREV CUR MEDS BY ELIG CLIN: HCPCS | Performed by: FAMILY MEDICINE

## 2023-06-28 PROCEDURE — 2022F DILAT RTA XM EVC RTNOPTHY: CPT | Performed by: FAMILY MEDICINE

## 2023-06-28 PROCEDURE — 3017F COLORECTAL CA SCREEN DOC REV: CPT | Performed by: FAMILY MEDICINE

## 2023-06-28 ASSESSMENT — ENCOUNTER SYMPTOMS
TROUBLE SWALLOWING: 0
SHORTNESS OF BREATH: 0
EYE DISCHARGE: 0
ABDOMINAL PAIN: 0
BACK PAIN: 0
RHINORRHEA: 0
CHOKING: 0
COUGH: 0
EYE REDNESS: 0
PHOTOPHOBIA: 0
DIARRHEA: 0
BLOOD IN STOOL: 0
SORE THROAT: 0
SINUS PAIN: 0
EYE PAIN: 0
ABDOMINAL DISTENTION: 0
WHEEZING: 0
COLOR CHANGE: 0
SINUS PRESSURE: 0
CHEST TIGHTNESS: 0
VOICE CHANGE: 0
NAUSEA: 0
VOMITING: 0
CONSTIPATION: 0

## 2023-06-28 ASSESSMENT — PATIENT HEALTH QUESTIONNAIRE - PHQ9
SUM OF ALL RESPONSES TO PHQ QUESTIONS 1-9: 0
2. FEELING DOWN, DEPRESSED OR HOPELESS: 0
SUM OF ALL RESPONSES TO PHQ9 QUESTIONS 1 & 2: 0
SUM OF ALL RESPONSES TO PHQ QUESTIONS 1-9: 0
1. LITTLE INTEREST OR PLEASURE IN DOING THINGS: 0

## 2023-06-29 ENCOUNTER — TELEPHONE (OUTPATIENT)
Dept: PRIMARY CARE CLINIC | Facility: CLINIC | Age: 73
End: 2023-06-29

## 2023-06-29 LAB
EST. AVERAGE GLUCOSE BLD GHB EST-MCNC: 298 MG/DL
HBA1C MFR BLD: 12 % (ref 4.8–5.6)

## 2023-07-03 ENCOUNTER — OFFICE VISIT (OUTPATIENT)
Dept: ENDOCRINOLOGY | Age: 73
End: 2023-07-03
Payer: MEDICARE

## 2023-07-03 ENCOUNTER — HOME HEALTH ADMISSION (OUTPATIENT)
Dept: HOME HEALTH SERVICES | Facility: HOME HEALTH | Age: 73
End: 2023-07-03

## 2023-07-03 VITALS
HEART RATE: 110 BPM | DIASTOLIC BLOOD PRESSURE: 86 MMHG | OXYGEN SATURATION: 98 % | WEIGHT: 169 LBS | BODY MASS INDEX: 28.12 KG/M2 | SYSTOLIC BLOOD PRESSURE: 122 MMHG

## 2023-07-03 DIAGNOSIS — E13.9 LADA (LATENT AUTOIMMUNE DIABETES IN ADULTS), MANAGED AS TYPE 1 (HCC): ICD-10-CM

## 2023-07-03 DIAGNOSIS — G30.9 ALZHEIMER'S DISEASE, UNSPECIFIED (CODE) (HCC): ICD-10-CM

## 2023-07-03 DIAGNOSIS — E10.65 TYPE 1 DIABETES MELLITUS WITH HYPERGLYCEMIA (HCC): Primary | ICD-10-CM

## 2023-07-03 PROBLEM — E11.29 TYPE II OR UNSPECIFIED TYPE DIABETES MELLITUS WITH RENAL MANIFESTATIONS, UNCONTROLLED(250.42): Status: RESOLVED | Noted: 2018-01-04 | Resolved: 2023-07-03

## 2023-07-03 PROBLEM — E10.319 DIABETIC RETINOPATHY OF BOTH EYES ASSOCIATED WITH TYPE 1 DIABETES MELLITUS (HCC): Status: ACTIVE | Noted: 2022-08-19

## 2023-07-03 PROBLEM — E11.65 TYPE II OR UNSPECIFIED TYPE DIABETES MELLITUS WITH RENAL MANIFESTATIONS, UNCONTROLLED(250.42): Status: RESOLVED | Noted: 2018-01-04 | Resolved: 2023-07-03

## 2023-07-03 PROCEDURE — 3017F COLORECTAL CA SCREEN DOC REV: CPT | Performed by: NURSE PRACTITIONER

## 2023-07-03 PROCEDURE — 1123F ACP DISCUSS/DSCN MKR DOCD: CPT | Performed by: NURSE PRACTITIONER

## 2023-07-03 PROCEDURE — 2022F DILAT RTA XM EVC RTNOPTHY: CPT | Performed by: NURSE PRACTITIONER

## 2023-07-03 PROCEDURE — 3046F HEMOGLOBIN A1C LEVEL >9.0%: CPT | Performed by: NURSE PRACTITIONER

## 2023-07-03 PROCEDURE — G8427 DOCREV CUR MEDS BY ELIG CLIN: HCPCS | Performed by: NURSE PRACTITIONER

## 2023-07-03 PROCEDURE — 1090F PRES/ABSN URINE INCON ASSESS: CPT | Performed by: NURSE PRACTITIONER

## 2023-07-03 PROCEDURE — 1036F TOBACCO NON-USER: CPT | Performed by: NURSE PRACTITIONER

## 2023-07-03 PROCEDURE — 95251 CONT GLUC MNTR ANALYSIS I&R: CPT | Performed by: NURSE PRACTITIONER

## 2023-07-03 PROCEDURE — 3079F DIAST BP 80-89 MM HG: CPT | Performed by: NURSE PRACTITIONER

## 2023-07-03 PROCEDURE — G8417 CALC BMI ABV UP PARAM F/U: HCPCS | Performed by: NURSE PRACTITIONER

## 2023-07-03 PROCEDURE — 3074F SYST BP LT 130 MM HG: CPT | Performed by: NURSE PRACTITIONER

## 2023-07-03 PROCEDURE — 99215 OFFICE O/P EST HI 40 MIN: CPT | Performed by: NURSE PRACTITIONER

## 2023-07-03 PROCEDURE — G8400 PT W/DXA NO RESULTS DOC: HCPCS | Performed by: NURSE PRACTITIONER

## 2023-07-03 RX ORDER — INSULIN GLARGINE 300 U/ML
84 INJECTION, SOLUTION SUBCUTANEOUS EVERY MORNING
Qty: 39 ML | Refills: 11
Start: 2023-07-03

## 2023-07-03 RX ORDER — DULAGLUTIDE 3 MG/.5ML
3 INJECTION, SOLUTION SUBCUTANEOUS WEEKLY
Qty: 2 ML | Refills: 11
Start: 2023-07-03

## 2023-07-03 RX ORDER — GLUCAGON INJECTION, SOLUTION 1 MG/.2ML
1 INJECTION, SOLUTION SUBCUTANEOUS PRN
Qty: 1 EACH | Refills: 5
Start: 2023-07-03

## 2023-07-03 RX ORDER — INSULIN LISPRO 100 [IU]/ML
15 INJECTION, SOLUTION INTRAVENOUS; SUBCUTANEOUS
Qty: 30 ML | Refills: 11
Start: 2023-07-03

## 2023-07-03 NOTE — PROGRESS NOTES
oriented to person, place, and time. KAMLESH Mata    On this date 7/3/2023 I have spent 63 minutes reviewing previous notes, test results and face to face with the patient discussing the diagnosis and importance of compliance with the treatment plan as well as documenting on the day of the visit. Portions of this note were generated with the assistance of voice recognition software. As such, some errors in transcription may be present.

## 2023-07-14 ENCOUNTER — COMMUNITY OUTREACH (OUTPATIENT)
Dept: PRIMARY CARE CLINIC | Facility: CLINIC | Age: 73
End: 2023-07-14

## 2023-10-02 NOTE — PROGRESS NOTES
Annie Weber, [facility]  [address], [address] [facility], [address]            Garcia Saavedra is seen today for follow up of type 1 diabetes mellitus. ASSESSMENT AND PLAN:  Interpretation of 72 hour glucose monitor: At least 72 hours of data were reviewed. The patient utilizes a Dexcom G6 continuous glucose monitoring system. The average glucose during the reviewed timeframe was 316 with a standard deviation of 89 (time in range 10%, time above range 90%, time below range 0%). 1. Type 1 diabetes mellitus with hyperglycemia (HCC)  A1c 12.4%, TIR 9%, GMI: 10.9%. Glycemic control poor. In fact, her numbers have gotten significantly worse since our last visit. Her  is unable to tell me what has changed. I recommended changing her insulin to U-500 which may help lower her numbers during the day regardless of when or what she is eating. However, they have a large supply of Toujeo at home and her  would like to defer changing her medications until he has used most of this up. Therefore, I made a 20% increase in her Toujeo dose and will have them take it at night instead of in the morning. Additionally, I made a 20% increase in her morning dose of Humalog. We will leave her lunch and dinner doses alone. Again I do not think we are going to be able to get her A1c to goal.  I am comfortable with taking a more conservative approach but would still like to have her A1c closer to 8. I will have them return in 6 weeks to evaluate how well her medication changes are controlling her glucose and then I will see her again in 3 months. Patient has Gvoke available for severe hypoglycemic events and is caregiver knowledgeable on administration.    - AMB POC HEMOGLOBIN A1C  - HUMALOG KWIKPEN 100 UNIT/ML SOPN; 24 units before breakfast, 15 units before lunch and dinner. Take 2 units for every 50 points greater than 150 mg/dL. Take with first bite of food.  Max daily

## 2023-10-04 ENCOUNTER — OFFICE VISIT (OUTPATIENT)
Dept: ENDOCRINOLOGY | Age: 73
End: 2023-10-04

## 2023-10-04 VITALS
SYSTOLIC BLOOD PRESSURE: 124 MMHG | WEIGHT: 173.2 LBS | BODY MASS INDEX: 29.57 KG/M2 | HEIGHT: 64 IN | DIASTOLIC BLOOD PRESSURE: 80 MMHG

## 2023-10-04 DIAGNOSIS — E10.65 TYPE 1 DIABETES MELLITUS WITH HYPERGLYCEMIA (HCC): Primary | ICD-10-CM

## 2023-10-04 LAB — HBA1C MFR BLD: 12.4 %

## 2023-10-04 RX ORDER — INSULIN GLARGINE 300 U/ML
100 INJECTION, SOLUTION SUBCUTANEOUS EVERY MORNING
Qty: 36 ML | Refills: 3
Start: 2023-10-04

## 2023-10-04 RX ORDER — INSULIN LISPRO 100 [IU]/ML
INJECTION, SOLUTION INTRAVENOUS; SUBCUTANEOUS
Qty: 90 ML | Refills: 0 | Status: SHIPPED | OUTPATIENT
Start: 2023-10-04

## 2023-11-15 ENCOUNTER — OFFICE VISIT (OUTPATIENT)
Dept: ENDOCRINOLOGY | Age: 73
End: 2023-11-15

## 2023-11-15 VITALS
DIASTOLIC BLOOD PRESSURE: 73 MMHG | WEIGHT: 172 LBS | OXYGEN SATURATION: 98 % | HEART RATE: 93 BPM | SYSTOLIC BLOOD PRESSURE: 122 MMHG | BODY MASS INDEX: 29.52 KG/M2

## 2023-11-15 DIAGNOSIS — E10.65 TYPE 1 DIABETES MELLITUS WITH HYPERGLYCEMIA (HCC): ICD-10-CM

## 2023-11-15 PROBLEM — F01.B0 MODERATE VASCULAR DEMENTIA (HCC): Status: ACTIVE | Noted: 2023-06-20

## 2023-11-15 RX ORDER — GLUCAGON INJECTION, SOLUTION 1 MG/.2ML
1 INJECTION, SOLUTION SUBCUTANEOUS PRN
Qty: 1 EACH | Refills: 5 | Status: SHIPPED | OUTPATIENT
Start: 2023-11-15

## 2023-11-15 RX ORDER — DULAGLUTIDE 4.5 MG/.5ML
4.5 INJECTION, SOLUTION SUBCUTANEOUS WEEKLY
Qty: 6 ML | Refills: 3 | Status: SHIPPED | OUTPATIENT
Start: 2023-11-15

## 2023-11-15 NOTE — PROGRESS NOTES
Nguyen Licea, [facility] [facility]  [address], [address] [facility], [address]            Aleshia Douglas is seen today for follow up of type 1 diabetes mellitus. ASSESSMENT AND PLAN:  Interpretation of 72 hour glucose monitor: At least 72 hours of data were reviewed. The patient utilizes a Dexcom G6 continuous glucose monitoring system. The average glucose during the reviewed timeframe was 244 with a standard deviation of 115 (time in range 28%, time above range 66%, time below range 6%). 1. Type 1 diabetes mellitus with hyperglycemia (HCC)  TIR 28%, GMI: 9.1%. Glycemic control sub optimal. However, there has been significant improvement since her last visit. When reviewing her CGM data, the last few days have been significantly better although she is having a large amount of hypoglycemia. Her  reports he has not been buying any sweets to have in the house. Will lower her Toujeo dose to 90 units today. Increase Trulicity to 4.5 mg. Advised her  to let me know if she continues to have pronounced hypoglycemia. Patient has Gvoke available for severe hypoglycemic events and is knowledgeable on administration.    - Madelon Cj 2-PACK 1 MG/0.2ML SOAJ; Inject 1 mg into the skin as needed (severe low blood sugar.)  Dispense: 1 each; Refill: 5  - TRULICITY 4.5 LG/8.9UR SOPN; Inject 4.5 mg into the skin once a week  Dispense: 6 mL; Refill: 3  - [place] CONTINUOUS GLUCOSE MONITORING ANALYSIS I&R        Return in about 8 weeks (around 1/10/2024). History of Present Illness:  Interim medical updates: Doing Toujeo at night now, but she's been crashing at night. Caregiver will be working part time starting next week. Barriers to care: memory issues. Spouse is primary caregiver and works 12 hour shifts 14 days/month. Children take care of her while  is at work, but she is often left alone.      Date of diagnosis: 12 at age 46, hx of GDM    Patient has no history

## 2023-12-11 ENCOUNTER — TELEPHONE (OUTPATIENT)
Dept: ENDOCRINOLOGY | Age: 73
End: 2023-12-11

## 2023-12-11 NOTE — TELEPHONE ENCOUNTER
She wears a Dexcom but uses a  so I do not know how low she is getting. Can we find out? Additionally, I need to know how much medication she is taking and whether or not any of her eating habits have changed recently. Please asked them to confirm overnight hypoglycemia with a fingerstick before treating.

## 2023-12-11 NOTE — TELEPHONE ENCOUNTER
Spoke to patient, 90 units for bed, gets low as 40-50s. Abdiel Lam was listening to phone call, was told to tell pt to take 80 units of toujeo before bed and confirm lows with finger stick.  He expressed understanding

## 2023-12-11 NOTE — TELEPHONE ENCOUNTER
Spoke to patients , it gets low about 4/5AM, she has no symptoms. They do not check with a finger stick to see how accurate it is. It started back about 2 weeks ago. He gives her 90 units of toujeo before bed, when it gets low, he gives her orange juice or candy and it goes up and its fine.     Please advise

## 2024-01-19 ENCOUNTER — OFFICE VISIT (OUTPATIENT)
Dept: ENDOCRINOLOGY | Age: 74
End: 2024-01-19

## 2024-01-19 VITALS
HEART RATE: 98 BPM | DIASTOLIC BLOOD PRESSURE: 82 MMHG | TEMPERATURE: 97.6 F | SYSTOLIC BLOOD PRESSURE: 118 MMHG | HEIGHT: 62 IN | BODY MASS INDEX: 30.99 KG/M2 | WEIGHT: 168.4 LBS | OXYGEN SATURATION: 99 %

## 2024-01-19 DIAGNOSIS — E10.65 TYPE 1 DIABETES MELLITUS WITH HYPERGLYCEMIA (HCC): Primary | ICD-10-CM

## 2024-01-19 LAB — HBA1C MFR BLD: 9.8 %

## 2024-01-19 RX ORDER — INSULIN HUMAN 500 [IU]/ML
INJECTION, SOLUTION SUBCUTANEOUS
Qty: 18 ML | Refills: 3 | Status: SHIPPED | OUTPATIENT
Start: 2024-01-19

## 2024-01-19 ASSESSMENT — PATIENT HEALTH QUESTIONNAIRE - PHQ9
1. LITTLE INTEREST OR PLEASURE IN DOING THINGS: 0
SUM OF ALL RESPONSES TO PHQ QUESTIONS 1-9: 0
SUM OF ALL RESPONSES TO PHQ9 QUESTIONS 1 & 2: 0
SUM OF ALL RESPONSES TO PHQ QUESTIONS 1-9: 0
SUM OF ALL RESPONSES TO PHQ QUESTIONS 1-9: 0
2. FEELING DOWN, DEPRESSED OR HOPELESS: 0
SUM OF ALL RESPONSES TO PHQ QUESTIONS 1-9: 0

## 2024-01-19 NOTE — PROGRESS NOTES
GDM    Patient has no history of previous DKA episodes.  Denies HX pancreatitis, gastroparesis, foot ulcer  No Hx of anemia.     Current Regimen: Humalog 25 units before breakfast, 15 in the afternoon, Toujeo Max 30-50 units in the PM, Trulicity 4.5 mg weekly on Saturday. Hyperglycemia protocol 15 units for BS over 450, 5 units 6 hours later if still over 450.     Failed therapies: metformin causes vomiting    Home blood glucose monitoring frequency:   By review of Dexcom G6 CGM download over past 30 days  Average blood glucose 261 ± 97  Time in range 23%  High 25%, Very High 52%  Low 0%, Very Low <1%     Typical Standard Deviation   0600 253 98   1200 331 67   1800 255 103   2200 235 104       Diet: Grazes - cookies and cakes, potato chips. Doesn't eat regular meals. Daughter cooks and sends food over. Patient \"picks at it\". Will usually eat about 1/2 of her pancake breakfast from [employer]. Has stopped eating in the last 6 months.   Diet cokes, bottled water.    Exercise:  No regular activity    Pregnancy: post-menopausal    Diabetes education: The patient has received formal diabetes education. Only went to one class.    Diabetic complications:     Retinopathy: Last eye exam was ~May 2023 and demonstrated diabetic retinopathy.  Eye care specialist is [facility].     Albuminuria/nephropathy: Stage 2 CKD with A2 albuminuria     Neuropathy: denies symptoms    Hypoglycemia: unaware, reports lows once every other month.     Hyperglycemia: no symptoms    Pertinent Co-morbid Diagnoses:   Cardiac: Stroke 1996  Current therapy: simvastatin - 40 MG with marginal compliance. Spouse gives it to her, but sometimes she doesn't take it.     The 10-year ASCVD risk score (Wellington FLORES, et al., 2019) is: 23.6%    Values used to calculate the score:      Age: 73 years      Sex: Female      Is Non- : No      Diabetic: Yes      Tobacco smoker: No      Systolic Blood Pressure: 122 mmHg      Is BP treated:

## 2024-01-22 ENCOUNTER — TELEPHONE (OUTPATIENT)
Dept: ENDOCRINOLOGY | Age: 74
End: 2024-01-22

## 2024-01-22 NOTE — TELEPHONE ENCOUNTER
Written treatment plan completed. Please let  know it is available for .     Katie Espinal, APRN - CNP

## 2024-01-22 NOTE — TELEPHONE ENCOUNTER
Patients  called asking about a specific thing you wrote for them for her new medication but they left it here and wants to know if they can get another copy of it.

## 2024-02-12 ENCOUNTER — TELEPHONE (OUTPATIENT)
Dept: ENDOCRINOLOGY | Age: 74
End: 2024-02-12

## 2024-02-12 NOTE — TELEPHONE ENCOUNTER
I called and spoke with the home health nurse. Stressed importance of taking the insulin as prescribed. She will discuss with  and instruct him to follow treatment plan given at our last visit. He will be told not to administer insulin between breakfast and dinner. Needs to confirm highs and lows with blood glucose. Asked HH nurse to calibrate sensor when she sees them next time. She will call with updates when she sees them later this week.     Katie Espinal, APRN - CNP

## 2024-02-12 NOTE — TELEPHONE ENCOUNTER
Phone call from [facility] nurse [phone] with sugar readings on patient from this weekend and today.    Saturday:  8pm: 200  11:30pm: 366  11:40pm: 377 -  gave 15 units of the 500    Sunday:   11:30am machine just read high - gave 30 units  2:00pm: machine just read high - gave 25 units    This morning Monday 2/12/24 at 2:20am, read high and gave 20 units.    Just now at 11:40am it is 375      Home health was very concerned.

## 2024-02-20 ENCOUNTER — TELEPHONE (OUTPATIENT)
Dept: ENDOCRINOLOGY | Age: 74
End: 2024-02-20

## 2024-03-06 ENCOUNTER — OFFICE VISIT (OUTPATIENT)
Dept: ENDOCRINOLOGY | Age: 74
End: 2024-03-06
Payer: MEDICARE

## 2024-03-06 VITALS
DIASTOLIC BLOOD PRESSURE: 80 MMHG | OXYGEN SATURATION: 99 % | BODY MASS INDEX: 30.07 KG/M2 | SYSTOLIC BLOOD PRESSURE: 124 MMHG | HEART RATE: 94 BPM | WEIGHT: 164.4 LBS

## 2024-03-06 DIAGNOSIS — E10.65 TYPE 1 DIABETES MELLITUS WITH HYPERGLYCEMIA (HCC): Primary | ICD-10-CM

## 2024-03-06 PROCEDURE — G8484 FLU IMMUNIZE NO ADMIN: HCPCS | Performed by: NURSE PRACTITIONER

## 2024-03-06 PROCEDURE — G8417 CALC BMI ABV UP PARAM F/U: HCPCS | Performed by: NURSE PRACTITIONER

## 2024-03-06 PROCEDURE — 3074F SYST BP LT 130 MM HG: CPT | Performed by: NURSE PRACTITIONER

## 2024-03-06 PROCEDURE — 2022F DILAT RTA XM EVC RTNOPTHY: CPT | Performed by: NURSE PRACTITIONER

## 2024-03-06 PROCEDURE — 3079F DIAST BP 80-89 MM HG: CPT | Performed by: NURSE PRACTITIONER

## 2024-03-06 PROCEDURE — 99214 OFFICE O/P EST MOD 30 MIN: CPT | Performed by: NURSE PRACTITIONER

## 2024-03-06 PROCEDURE — 1123F ACP DISCUSS/DSCN MKR DOCD: CPT | Performed by: NURSE PRACTITIONER

## 2024-03-06 PROCEDURE — G8400 PT W/DXA NO RESULTS DOC: HCPCS | Performed by: NURSE PRACTITIONER

## 2024-03-06 PROCEDURE — G8427 DOCREV CUR MEDS BY ELIG CLIN: HCPCS | Performed by: NURSE PRACTITIONER

## 2024-03-06 PROCEDURE — 3046F HEMOGLOBIN A1C LEVEL >9.0%: CPT | Performed by: NURSE PRACTITIONER

## 2024-03-06 PROCEDURE — 3017F COLORECTAL CA SCREEN DOC REV: CPT | Performed by: NURSE PRACTITIONER

## 2024-03-06 PROCEDURE — 1090F PRES/ABSN URINE INCON ASSESS: CPT | Performed by: NURSE PRACTITIONER

## 2024-03-06 PROCEDURE — 95251 CONT GLUC MNTR ANALYSIS I&R: CPT | Performed by: NURSE PRACTITIONER

## 2024-03-06 PROCEDURE — 1036F TOBACCO NON-USER: CPT | Performed by: NURSE PRACTITIONER

## 2024-03-06 RX ORDER — INSULIN HUMAN 500 [IU]/ML
INJECTION, SOLUTION SUBCUTANEOUS
Qty: 18 ML | Refills: 3
Start: 2024-03-06

## 2024-03-06 NOTE — PROGRESS NOTES
[facility], [facility]  [address], [address]  [place] [address]            Christina Goncalves is seen today for follow up of type 1 diabetes mellitus.      ASSESSMENT AND PLAN:  Interpretation of 72 hour glucose monitor:  At least 72 hours of data were reviewed.  The patient utilizes a Dexcom G6 continuous glucose monitoring system.  The average glucose during the reviewed timeframe was 229 with a standard deviation of 106 (time in range 36%, time above range 61%, time below range 3%).     1. Type 1 diabetes mellitus with hyperglycemia (HCC)  TIR 36%, GMI: 8.8%. Glycemic control sub optimal. There has been marginal improvement since her last visit.   Advised patient's  that he absolutely must stop administering U500 the way he currently is. He is likely causing a significant amount of her excursions. He was instructed to give 35 units at breakfast and 15 at dinner and not to give any insulin any other time of the day. He is to return in 1 week for Dexcom download so that I can make adjustments if necessary. Probably need to consider giving him guidance on using Humalog to bring her down when she's high but will defer that to our next appointment.   Patient has Gvoke available for severe hypoglycemic events and is knowledgeable on administration.    - HUMULIN R U-500 KWIKPEN 500 UNIT/ML SOPN concentrated injection pen; Inject 35 Units into the skin every morning (before breakfast) AND 15 Units daily (before dinner). Take 30 minutes before meals..  Dispense: 18 mL; Refill: 3  - AR CONTINUOUS GLUCOSE MONITORING ANALYSIS I&R        Return in about 6 weeks (around 4/19/2024).     History of Present Illness:  Interim medical updates: Received call from her  nurse with concerns about her blood sugars on 02/12/2024. Her  was not administering her insulin correctly. Administration instruction were reinforced by  nurse at my direction. However, he continues to

## 2024-03-08 ENCOUNTER — TELEPHONE (OUTPATIENT)
Dept: ENDOCRINOLOGY | Age: 74
End: 2024-03-08

## 2024-03-08 NOTE — TELEPHONE ENCOUNTER
Phone call from patient stating from 2am until patient gets up in morning and given her insuling, her sugars stay high. I asked the  what the numbers were during the night on the , but he says all it says is high. Once she is given her insulin in morning it goes back down. He was told to call if this happened he said.

## 2024-03-15 NOTE — TELEPHONE ENCOUNTER
Spoke to the Pt's , he expressed understanding with Katie's reply. He expressed understanding with the dose schedule. Has one problem with the 450 BG, his meter doesn't go that high nor does his Dexcom. He is going to the Pharmacy to see if he can find a meter that will go that high instead of Hi over 400. He will bring the  and meter by next week to download.

## 2024-03-15 NOTE — TELEPHONE ENCOUNTER
Pt's  came by and downloaded her Dexcom, Please review this is after the increase of 20 units at night. Pt has drank 1 gal of milk, Pt's  would like for her to go back on Toujeo and Humalog instead of Humulin R U500. He has no control on Humulin R U-500. Please call Sohail at [phone] first, this is a land line all other number ar mobile and they are not near them as much.

## 2024-03-15 NOTE — TELEPHONE ENCOUNTER
Please call Trinidad Sacha.     If he insists on going back on the Toujeo and Humalog, then that's what we will do. However, there isn't any way to predict what is going to happen to her glucose if she drinks an entire gallon of milk in one sitting. That's around 200 carbs all by itself, which is more than the recommended amount for the entire day.    If he want to resume Toujeo and Humalog this is what he needs to do:     Hold the evening dose of U500 tonight. Do not administer Toujeo tonight. Tomorrow morning, he can go back to using  Humalog at the following dosin units at breakfast, 10 units at lunch and 10 units at dinner. If her blood sugar is reading HI on the Dexcom he should administer 15 units for BS over 450, 5 units 6 hours later if still over 450.     He can resume Toujeo tomorrow night at 50 units.     Please ask him not to vary his administration from this directions. I would like for him to come back by in a week or so and let us download his meter again.

## 2024-04-03 DIAGNOSIS — E10.65 TYPE 1 DIABETES MELLITUS WITH HYPERGLYCEMIA (HCC): ICD-10-CM

## 2024-04-03 RX ORDER — INSULIN LISPRO 100 [IU]/ML
INJECTION, SOLUTION INTRAVENOUS; SUBCUTANEOUS
Qty: 45 ML | Refills: 0 | Status: SHIPPED | OUTPATIENT
Start: 2024-04-03

## 2024-04-03 RX ORDER — INSULIN LISPRO 100 [IU]/ML
INJECTION, SOLUTION INTRAVENOUS; SUBCUTANEOUS
Qty: 45 ML | Refills: 0 | Status: SHIPPED | OUTPATIENT
Start: 2024-04-03 | End: 2024-04-03

## 2024-04-03 RX ORDER — INSULIN GLARGINE 300 U/ML
50 INJECTION, SOLUTION SUBCUTANEOUS EVERY MORNING
Qty: 15 ML | Refills: 0
Start: 2024-04-03

## 2024-04-03 RX ORDER — INSULIN LISPRO 100 [IU]/ML
INJECTION, SOLUTION INTRAVENOUS; SUBCUTANEOUS
Status: CANCELLED | OUTPATIENT
Start: 2024-04-03

## 2024-04-03 NOTE — TELEPHONE ENCOUNTER
I spoke to the PT's , he expressed understanding with the change of the correction scale. I called out different numbers and he gave me the correct amount. He is giving her 50 units of Toujeo nightly.     Sending to Katie JONES.

## 2024-04-03 NOTE — TELEPHONE ENCOUNTER
Pt came into the office needing a Rx sent to [facility]. The last Rx states no print, Pt needs it sent to a Pharmacy. Only has half a pen left.     Per Katie encounter response on 3/9/23:     24 units at breakfast, 10 units at lunch and 10 units at dinner. If her blood sugar is reading HI on the Dexcom he should administer 15 units for BS over 450, 5 units 6 hours later if still over 450     Pended Rx but need help in filling out.

## 2024-04-03 NOTE — TELEPHONE ENCOUNTER
U-500 removed from med list    Toujeo 50 units added to med list but not sent    Humalog pens sent to [facility] with the following instructions:    24 units at breakfast, 10 units at lunch and 10 units at dinner, plus 1/25>150 AC/HS, max daily dose 50 units    Please confirm and document that pt/ understand correction scale and is using 50 units of toujeo. Completely stopped U-500

## 2024-04-17 DIAGNOSIS — E10.65 TYPE 1 DIABETES MELLITUS WITH HYPERGLYCEMIA (HCC): ICD-10-CM

## 2024-04-17 NOTE — TELEPHONE ENCOUNTER
Pt called left VM requesting a refill for Toujeo. I have pended a refill for Katie and confirmed her pharmacy.

## 2024-04-18 RX ORDER — INSULIN GLARGINE 300 U/ML
50 INJECTION, SOLUTION SUBCUTANEOUS EVERY MORNING
Qty: 24 ML | Refills: 3 | Status: SHIPPED | OUTPATIENT
Start: 2024-04-18

## 2024-04-23 ENCOUNTER — OFFICE VISIT (OUTPATIENT)
Dept: ENDOCRINOLOGY | Age: 74
End: 2024-04-23
Payer: MEDICARE

## 2024-04-23 VITALS
SYSTOLIC BLOOD PRESSURE: 124 MMHG | HEART RATE: 74 BPM | BODY MASS INDEX: 30.91 KG/M2 | DIASTOLIC BLOOD PRESSURE: 80 MMHG | WEIGHT: 169 LBS

## 2024-04-23 DIAGNOSIS — E10.65 TYPE 1 DIABETES MELLITUS WITH HYPERGLYCEMIA (HCC): Primary | ICD-10-CM

## 2024-04-23 LAB — HBA1C MFR BLD: 10.1 %

## 2024-04-23 PROCEDURE — 1123F ACP DISCUSS/DSCN MKR DOCD: CPT | Performed by: NURSE PRACTITIONER

## 2024-04-23 PROCEDURE — 83036 HEMOGLOBIN GLYCOSYLATED A1C: CPT | Performed by: NURSE PRACTITIONER

## 2024-04-23 PROCEDURE — 3017F COLORECTAL CA SCREEN DOC REV: CPT | Performed by: NURSE PRACTITIONER

## 2024-04-23 PROCEDURE — G8427 DOCREV CUR MEDS BY ELIG CLIN: HCPCS | Performed by: NURSE PRACTITIONER

## 2024-04-23 PROCEDURE — 3079F DIAST BP 80-89 MM HG: CPT | Performed by: NURSE PRACTITIONER

## 2024-04-23 PROCEDURE — 3046F HEMOGLOBIN A1C LEVEL >9.0%: CPT | Performed by: NURSE PRACTITIONER

## 2024-04-23 PROCEDURE — 3074F SYST BP LT 130 MM HG: CPT | Performed by: NURSE PRACTITIONER

## 2024-04-23 PROCEDURE — G8417 CALC BMI ABV UP PARAM F/U: HCPCS | Performed by: NURSE PRACTITIONER

## 2024-04-23 PROCEDURE — 95251 CONT GLUC MNTR ANALYSIS I&R: CPT | Performed by: NURSE PRACTITIONER

## 2024-04-23 PROCEDURE — 99214 OFFICE O/P EST MOD 30 MIN: CPT | Performed by: NURSE PRACTITIONER

## 2024-04-23 PROCEDURE — G8400 PT W/DXA NO RESULTS DOC: HCPCS | Performed by: NURSE PRACTITIONER

## 2024-04-23 PROCEDURE — 1090F PRES/ABSN URINE INCON ASSESS: CPT | Performed by: NURSE PRACTITIONER

## 2024-04-23 PROCEDURE — 2022F DILAT RTA XM EVC RTNOPTHY: CPT | Performed by: NURSE PRACTITIONER

## 2024-04-23 PROCEDURE — 1036F TOBACCO NON-USER: CPT | Performed by: NURSE PRACTITIONER

## 2024-04-23 RX ORDER — INSULIN GLARGINE 300 U/ML
30 INJECTION, SOLUTION SUBCUTANEOUS 2 TIMES DAILY
Qty: 30 ML | Refills: 3 | Status: SHIPPED | OUTPATIENT
Start: 2024-04-23

## 2024-04-23 RX ORDER — INSULIN LISPRO 100 [IU]/ML
INJECTION, SOLUTION INTRAVENOUS; SUBCUTANEOUS
Qty: 130 ML | Refills: 3 | Status: SHIPPED | OUTPATIENT
Start: 2024-04-23

## 2024-04-23 RX ORDER — INSULIN GLARGINE 300 U/ML
50 INJECTION, SOLUTION SUBCUTANEOUS EVERY MORNING
Qty: 24 ML | Refills: 3 | Status: SHIPPED | OUTPATIENT
Start: 2024-04-23 | End: 2024-04-23

## 2024-04-23 NOTE — PROGRESS NOTES
lows once every other month.     Hyperglycemia: no symptoms    Pertinent Co-morbid Diagnoses:   Cardiac: Stroke   Current therapy: simvastatin - 40 MG with marginal compliance. Spouse gives it to her, but sometimes she doesn't take it.     The ASCVD Risk score (Wellington FLORES, et al., 2019) failed to calculate for the following reasons:    The patient has a prior MI or stroke diagnosis    Renal:   Under care of nephro? no   on Ace-I/ARB lisinopril-hydroCHLOROthiazide - 20-12.5 MG    Computed KFRE 2-Year unavailable. Necessary lab results were not found in the last year.    Liver:    Hx of hepatitis: denies; Hx of excessive alcohol consumption: denies    Pertinent Lab History:    Hemoglobin A1c:   2018: 12.8%   2018: 13.6%   2021: 13.9%   2021: >15.5%   2022: 12.2%   2022: 12.2%   10/14/2022: 12.6%   2023: 11.7%   2023: 12.0%   10/04/2023: 12.4%   2023: 10.4%   2024: 9.8%   2024: 10.1%    Lipids:    2023: TC: 183; HDL: 43; LDL: 80.8; T; VLDL: 59.2    Renal:    2021: Creatinine: 0.82; eGFR: 73, uACR: 35   2023: Creatinine: 0.90; eGFR: >60   2023: Creatinine: 0.64; eGFR: >90    Liver:    2023: ALT: 17; AST: 15; Platelets: 227     Miscellaneous Labs:  C-Peptide:    2022: <0.1 (fasting glucose: 229)  WENDY-65:    2022: 14,258.3  Vitamin D:    2022: 26.6      Diagnosis: hypothyroidism    Prior Treatment: none    Current Regimen:  125 mcg generic levothyroxine    They are taking their medication in the morning on an empty stomach with a full glass of water and with all of her other medications. They do not wait before having anything to eat or drink.      Pertinent labs:   2021: TSH: 0.099   2021: TSH: 0.671   2023: TSH: 5.380   2023: TSH: 3.855    Imaging: none found    Review of Systems   Unable to perform ROS: Dementia       /80 (Site: Right Upper Arm, Position: Sitting, Cuff

## 2024-04-30 DIAGNOSIS — E10.65 TYPE 1 DIABETES MELLITUS WITH HYPERGLYCEMIA (HCC): ICD-10-CM

## 2024-04-30 RX ORDER — INSULIN LISPRO 100 [IU]/ML
INJECTION, SOLUTION INTRAVENOUS; SUBCUTANEOUS
Qty: 108 ML | Refills: 3 | Status: SHIPPED | OUTPATIENT
Start: 2024-04-30

## 2024-05-03 ENCOUNTER — TELEPHONE (OUTPATIENT)
Dept: ENDOCRINOLOGY | Age: 74
End: 2024-05-03

## 2024-05-03 DIAGNOSIS — E10.65 TYPE 1 DIABETES MELLITUS WITH HYPERGLYCEMIA (HCC): ICD-10-CM

## 2024-05-03 RX ORDER — INSULIN GLARGINE 300 U/ML
30 INJECTION, SOLUTION SUBCUTANEOUS 2 TIMES DAILY
Qty: 30 ML | Refills: 3 | Status: SHIPPED | OUTPATIENT
Start: 2024-05-03 | End: 2024-05-03

## 2024-05-03 RX ORDER — INSULIN GLARGINE 300 U/ML
30 INJECTION, SOLUTION SUBCUTANEOUS 2 TIMES DAILY
Qty: 30 ML | Refills: 3 | Status: SHIPPED | OUTPATIENT
Start: 2024-05-03

## 2024-05-03 NOTE — TELEPHONE ENCOUNTER
I only have one active prescription for 30 units BID. Not sure what else they have, but that's what she should be taking.   
Pt's pharmacy left message questing pt's toujeo. Pt's pharma states there are two different RX's that were sent in for this medication with two different directions.   Please advise.  
Sent to [facility] and [facility] as I wasn't sure which pharmacy was calling.   
Risperdal

## 2024-06-13 ENCOUNTER — TELEPHONE (OUTPATIENT)
Dept: ENDOCRINOLOGY | Age: 74
End: 2024-06-13

## 2024-06-13 DIAGNOSIS — E10.65 TYPE 1 DIABETES MELLITUS WITH HYPERGLYCEMIA (HCC): ICD-10-CM

## 2024-06-13 RX ORDER — INSULIN LISPRO 100 [IU]/ML
INJECTION, SOLUTION INTRAVENOUS; SUBCUTANEOUS
Qty: 108 ML | Refills: 3 | Status: SHIPPED | OUTPATIENT
Start: 2024-06-13

## 2024-06-18 ENCOUNTER — OFFICE VISIT (OUTPATIENT)
Dept: INTERNAL MEDICINE CLINIC | Facility: CLINIC | Age: 74
End: 2024-06-18
Payer: MEDICARE

## 2024-06-18 VITALS
BODY MASS INDEX: 31.1 KG/M2 | OXYGEN SATURATION: 95 % | WEIGHT: 169 LBS | HEIGHT: 62 IN | DIASTOLIC BLOOD PRESSURE: 64 MMHG | SYSTOLIC BLOOD PRESSURE: 118 MMHG | HEART RATE: 120 BPM

## 2024-06-18 DIAGNOSIS — E03.9 PRIMARY HYPOTHYROIDISM: ICD-10-CM

## 2024-06-18 DIAGNOSIS — E10.65 TYPE 1 DIABETES MELLITUS WITH HYPERGLYCEMIA (HCC): Primary | Chronic | ICD-10-CM

## 2024-06-18 DIAGNOSIS — I10 ESSENTIAL HYPERTENSION: ICD-10-CM

## 2024-06-18 DIAGNOSIS — E78.00 HYPERCHOLESTEROLEMIA: ICD-10-CM

## 2024-06-18 DIAGNOSIS — E55.9 VITAMIN D DEFICIENCY: ICD-10-CM

## 2024-06-18 DIAGNOSIS — E10.65 TYPE 1 DIABETES MELLITUS WITH HYPERGLYCEMIA (HCC): ICD-10-CM

## 2024-06-18 DIAGNOSIS — F01.B0 MODERATE VASCULAR DEMENTIA WITHOUT BEHAVIORAL DISTURBANCE, PSYCHOTIC DISTURBANCE, MOOD DISTURBANCE, OR ANXIETY (HCC): Chronic | ICD-10-CM

## 2024-06-18 PROCEDURE — G8400 PT W/DXA NO RESULTS DOC: HCPCS | Performed by: NURSE PRACTITIONER

## 2024-06-18 PROCEDURE — 1036F TOBACCO NON-USER: CPT | Performed by: NURSE PRACTITIONER

## 2024-06-18 PROCEDURE — G8417 CALC BMI ABV UP PARAM F/U: HCPCS | Performed by: NURSE PRACTITIONER

## 2024-06-18 PROCEDURE — 3078F DIAST BP <80 MM HG: CPT | Performed by: NURSE PRACTITIONER

## 2024-06-18 PROCEDURE — 99215 OFFICE O/P EST HI 40 MIN: CPT | Performed by: NURSE PRACTITIONER

## 2024-06-18 PROCEDURE — 1123F ACP DISCUSS/DSCN MKR DOCD: CPT | Performed by: NURSE PRACTITIONER

## 2024-06-18 PROCEDURE — 1090F PRES/ABSN URINE INCON ASSESS: CPT | Performed by: NURSE PRACTITIONER

## 2024-06-18 PROCEDURE — 3017F COLORECTAL CA SCREEN DOC REV: CPT | Performed by: NURSE PRACTITIONER

## 2024-06-18 PROCEDURE — 3074F SYST BP LT 130 MM HG: CPT | Performed by: NURSE PRACTITIONER

## 2024-06-18 PROCEDURE — 2022F DILAT RTA XM EVC RTNOPTHY: CPT | Performed by: NURSE PRACTITIONER

## 2024-06-18 PROCEDURE — 3046F HEMOGLOBIN A1C LEVEL >9.0%: CPT | Performed by: NURSE PRACTITIONER

## 2024-06-18 PROCEDURE — G8427 DOCREV CUR MEDS BY ELIG CLIN: HCPCS | Performed by: NURSE PRACTITIONER

## 2024-06-18 RX ORDER — ACETAMINOPHEN 160 MG
TABLET,DISINTEGRATING ORAL
COMMUNITY

## 2024-06-18 RX ORDER — INSULIN LISPRO 100 [IU]/ML
INJECTION, SOLUTION INTRAVENOUS; SUBCUTANEOUS
Qty: 45 ML | Refills: 3 | Status: SHIPPED | OUTPATIENT
Start: 2024-06-18

## 2024-06-18 SDOH — ECONOMIC STABILITY: FOOD INSECURITY: WITHIN THE PAST 12 MONTHS, THE FOOD YOU BOUGHT JUST DIDN'T LAST AND YOU DIDN'T HAVE MONEY TO GET MORE.: NEVER TRUE

## 2024-06-18 SDOH — ECONOMIC STABILITY: FOOD INSECURITY: WITHIN THE PAST 12 MONTHS, YOU WORRIED THAT YOUR FOOD WOULD RUN OUT BEFORE YOU GOT MONEY TO BUY MORE.: NEVER TRUE

## 2024-06-18 SDOH — ECONOMIC STABILITY: INCOME INSECURITY: HOW HARD IS IT FOR YOU TO PAY FOR THE VERY BASICS LIKE FOOD, HOUSING, MEDICAL CARE, AND HEATING?: NOT HARD AT ALL

## 2024-06-18 ASSESSMENT — PATIENT HEALTH QUESTIONNAIRE - PHQ9
SUM OF ALL RESPONSES TO PHQ QUESTIONS 1-9: 0
SUM OF ALL RESPONSES TO PHQ9 QUESTIONS 1 & 2: 0
1. LITTLE INTEREST OR PLEASURE IN DOING THINGS: NOT AT ALL
2. FEELING DOWN, DEPRESSED OR HOPELESS: NOT AT ALL
SUM OF ALL RESPONSES TO PHQ QUESTIONS 1-9: 0

## 2024-06-18 NOTE — PROGRESS NOTES
COMPREHENSIVE EVALUATION, NEW PATIENT    SUBJECTIVE:   Christina Goncalves is a 74 y.o. female is a new patient to our practice.  she is being seen today for a comprehensive evaluation of existing and new medical problems.     Chief Complaint   Patient presents with    Establish Care       Previous health care providers include:  Dr. Denny    HPI  Mrs. Goncalves is accompanied by her .  He reports she has a problem with memory.        Diabetes   Gestational when pregnant with twins in 1987.  Used metformin for a while.  She then started novolog 70/30.  Now on Toujeo max 30 units BID.   Humalog :  24 units at breakfast, 15 units at lunch and 15 units at dinner, and sliding scale.  Blood glucose runs about 150 -200 most of the day but in the mornings it is 350 gives some extra Toujeo.  She does drink juices and milk -- only buys 1/2 gallon.  Would eat large bag of chips if left alone.    Hypothyroid   Gradual onset.  May or may not take the medicine.    Hypertension  Onset remote.  Gradual onset.  Her father had hypertension.       Hypercholesterolemia     History stroke in mid 1990s   report she had a carotid artery tear.  He states she was turning her head around too far.  Per  stroke caused problem with understnading.  Uses cane in right hand.    Glaucoma had surgery to open     Retinopathy changes  sees .  has require laser surgery once.  Now stable.        Current Outpatient Medications   Medication Sig Dispense Refill    Cholecalciferol (VITAMIN D3) 50 MCG (2000 UT) CAPS Take by mouth      Multiple Vitamin (TAB-A-ALEXIS PO) Take 1 tablet by mouth daily      HUMALOG KWIKPEN 100 UNIT/ML SOPN 24 units at breakfast, 15 units at lunch and 15 units at dinner, plus 2/25>150 AC/HS, max daily dose 120 units 108 mL 3    TOUJEO MAX SOLOSTAR 300 UNIT/ML SOPN Inject 30 Units into the skin in the morning and at bedtime Titrate by 2 units every 3 days to fasting BG goal of  mg/dL. Max Daily Dose: 100

## 2024-06-28 NOTE — PROGRESS NOTES
nephro? no   on Ace-I/ARB lisinopril-hydroCHLOROthiazide - 20-12.5 MG    Computed KFRE 2-Year unavailable. Necessary lab results were not found in the last year.    Liver:    Hx of hepatitis: denies; Hx of excessive alcohol consumption: denies    Pertinent Lab History:    Hemoglobin A1c:   2018: 12.8%   2018: 13.6%   2021: 13.9%   2021: >15.5%   2022: 12.2%   2022: 12.2%   10/14/2022: 12.6%   2023: 11.7%   2023: 12.0%   10/04/2023: 12.4%   2023: 10.4%   2024: 9.8%   2024: 10.1%   2024: 9.8%    Lipids:    2023: TC: 183; HDL: 43; LDL: 80.8; T; VLDL: 59.2    Renal:    2021: Creatinine: 0.82; eGFR: 73, uACR: 35   2023: Creatinine: 0.90; eGFR: >60   2023: Creatinine: 0.64; eGFR: >90    Liver:    2023: ALT: 17; AST: 15; Platelets: 227     Miscellaneous Labs:  C-Peptide:    2022: <0.1 (fasting glucose: 229)  WENDY-65:    2022: 14,258.3  Vitamin D:    2022: 26.6      Diagnosis: hypothyroidism    Prior Treatment: none    Current Regimen:  125 mcg generic levothyroxine    They are taking their medication in the morning on an empty stomach with a full glass of water and with all of her other medications. They do not wait before having anything to eat or drink.      Pertinent labs:   2021: TSH: 0.099   2021: TSH: 0.671   2023: TSH: 5.380   2023: TSH: 3.855    Imaging: none found    Review of Systems   Unable to perform ROS: Dementia       /72 (Site: Left Upper Arm, Position: Sitting, Cuff Size: Large Adult)   Pulse 74   Wt 78.5 kg (173 lb)   BMI 31.64 kg/m²     Wt Readings from Last 3 Encounters:   24 78.5 kg (173 lb)   24 76.7 kg (169 lb)   24 76.7 kg (169 lb)     Body weight trend: declining steadily    Physical Exam  Constitutional:       Appearance: Normal appearance.   HENT:      Head: Normocephalic.   Neck:      Thyroid: No thyroid mass, thyromegaly

## 2024-07-01 ENCOUNTER — OFFICE VISIT (OUTPATIENT)
Dept: ENDOCRINOLOGY | Age: 74
End: 2024-07-01
Payer: MEDICARE

## 2024-07-01 VITALS
WEIGHT: 173 LBS | HEART RATE: 74 BPM | DIASTOLIC BLOOD PRESSURE: 72 MMHG | BODY MASS INDEX: 31.64 KG/M2 | SYSTOLIC BLOOD PRESSURE: 120 MMHG

## 2024-07-01 DIAGNOSIS — E10.65 TYPE 1 DIABETES MELLITUS WITH HYPERGLYCEMIA (HCC): Primary | ICD-10-CM

## 2024-07-01 LAB — HBA1C MFR BLD: 9.8 %

## 2024-07-01 PROCEDURE — 95251 CONT GLUC MNTR ANALYSIS I&R: CPT | Performed by: NURSE PRACTITIONER

## 2024-07-01 PROCEDURE — 1036F TOBACCO NON-USER: CPT | Performed by: NURSE PRACTITIONER

## 2024-07-01 PROCEDURE — 3046F HEMOGLOBIN A1C LEVEL >9.0%: CPT | Performed by: NURSE PRACTITIONER

## 2024-07-01 PROCEDURE — 2022F DILAT RTA XM EVC RTNOPTHY: CPT | Performed by: NURSE PRACTITIONER

## 2024-07-01 PROCEDURE — G8400 PT W/DXA NO RESULTS DOC: HCPCS | Performed by: NURSE PRACTITIONER

## 2024-07-01 PROCEDURE — 1123F ACP DISCUSS/DSCN MKR DOCD: CPT | Performed by: NURSE PRACTITIONER

## 2024-07-01 PROCEDURE — 1090F PRES/ABSN URINE INCON ASSESS: CPT | Performed by: NURSE PRACTITIONER

## 2024-07-01 PROCEDURE — 3078F DIAST BP <80 MM HG: CPT | Performed by: NURSE PRACTITIONER

## 2024-07-01 PROCEDURE — 3074F SYST BP LT 130 MM HG: CPT | Performed by: NURSE PRACTITIONER

## 2024-07-01 PROCEDURE — G8417 CALC BMI ABV UP PARAM F/U: HCPCS | Performed by: NURSE PRACTITIONER

## 2024-07-01 PROCEDURE — 3017F COLORECTAL CA SCREEN DOC REV: CPT | Performed by: NURSE PRACTITIONER

## 2024-07-01 PROCEDURE — G8427 DOCREV CUR MEDS BY ELIG CLIN: HCPCS | Performed by: NURSE PRACTITIONER

## 2024-07-01 PROCEDURE — 99214 OFFICE O/P EST MOD 30 MIN: CPT | Performed by: NURSE PRACTITIONER

## 2024-07-01 PROCEDURE — 83036 HEMOGLOBIN GLYCOSYLATED A1C: CPT | Performed by: NURSE PRACTITIONER

## 2024-07-01 RX ORDER — INSULIN GLARGINE 300 U/ML
36 INJECTION, SOLUTION SUBCUTANEOUS 2 TIMES DAILY
Qty: 30 ML | Refills: 3
Start: 2024-07-01

## 2024-07-01 RX ORDER — INSULIN LISPRO 100 [IU]/ML
15 INJECTION, SOLUTION INTRAVENOUS; SUBCUTANEOUS
Qty: 45 ML | Refills: 3
Start: 2024-07-01

## 2024-07-10 ENCOUNTER — HOSPITAL ENCOUNTER (OUTPATIENT)
Dept: LAB | Age: 74
Discharge: HOME | End: 2024-07-13

## 2024-07-10 DIAGNOSIS — E78.00 HYPERCHOLESTEROLEMIA: ICD-10-CM

## 2024-07-10 DIAGNOSIS — E10.65 TYPE 1 DIABETES MELLITUS WITH HYPERGLYCEMIA (HCC): Chronic | ICD-10-CM

## 2024-07-10 DIAGNOSIS — Z00.00 LABORATORY EXAM ORDERED AS PART OF ROUTINE GENERAL MEDICAL EXAMINATION: ICD-10-CM

## 2024-07-10 LAB
ALBUMIN SERPL-MCNC: 3.4 G/DL (ref 3.2–4.6)
ALBUMIN/GLOB SERPL: 0.9 (ref 1–1.9)
ALP SERPL-CCNC: 135 U/L (ref 35–104)
ALT SERPL-CCNC: 16 U/L (ref 12–65)
ANION GAP SERPL CALC-SCNC: 13 MMOL/L (ref 9–18)
AST SERPL-CCNC: 22 U/L (ref 15–37)
BASOPHILS # BLD: 0.1 K/UL (ref 0–0.2)
BASOPHILS NFR BLD: 1 % (ref 0–2)
BILIRUB SERPL-MCNC: 0.4 MG/DL (ref 0–1.2)
BUN SERPL-MCNC: 16 MG/DL (ref 8–23)
CALCIUM SERPL-MCNC: 9.3 MG/DL (ref 8.8–10.2)
CHLORIDE SERPL-SCNC: 100 MMOL/L (ref 98–107)
CHOLEST SERPL-MCNC: 264 MG/DL (ref 0–200)
CO2 SERPL-SCNC: 26 MMOL/L (ref 20–28)
CREAT SERPL-MCNC: 0.74 MG/DL (ref 0.6–1.1)
DIFFERENTIAL METHOD BLD: NORMAL
EOSINOPHIL # BLD: 0.2 K/UL (ref 0–0.8)
EOSINOPHIL NFR BLD: 5 % (ref 0.5–7.8)
ERYTHROCYTE [DISTWIDTH] IN BLOOD BY AUTOMATED COUNT: 13.1 % (ref 11.9–14.6)
GLOBULIN SER CALC-MCNC: 3.6 G/DL (ref 2.3–3.5)
GLUCOSE SERPL-MCNC: 224 MG/DL (ref 70–99)
HCT VFR BLD AUTO: 43.5 % (ref 35.8–46.3)
HDLC SERPL-MCNC: 56 MG/DL (ref 40–60)
HDLC SERPL: 4.7 (ref 0–5)
HGB BLD-MCNC: 14.1 G/DL (ref 11.7–15.4)
IMM GRANULOCYTES # BLD AUTO: 0 K/UL (ref 0–0.5)
IMM GRANULOCYTES NFR BLD AUTO: 0 % (ref 0–5)
LDLC SERPL CALC-MCNC: 156 MG/DL (ref 0–100)
LYMPHOCYTES # BLD: 1.6 K/UL (ref 0.5–4.6)
LYMPHOCYTES NFR BLD: 37 % (ref 13–44)
MCH RBC QN AUTO: 28.5 PG (ref 26.1–32.9)
MCHC RBC AUTO-ENTMCNC: 32.4 G/DL (ref 31.4–35)
MCV RBC AUTO: 87.9 FL (ref 82–102)
MONOCYTES # BLD: 0.3 K/UL (ref 0.1–1.3)
MONOCYTES NFR BLD: 7 % (ref 4–12)
NEUTS SEG # BLD: 2.2 K/UL (ref 1.7–8.2)
NEUTS SEG NFR BLD: 50 % (ref 43–78)
NRBC # BLD: 0 K/UL (ref 0–0.2)
PLATELET # BLD AUTO: 317 K/UL (ref 150–450)
PMV BLD AUTO: 9.4 FL (ref 9.4–12.3)
POTASSIUM SERPL-SCNC: 4.4 MMOL/L (ref 3.5–5.1)
PROT SERPL-MCNC: 7 G/DL (ref 6.3–8.2)
RBC # BLD AUTO: 4.95 M/UL (ref 4.05–5.2)
SODIUM SERPL-SCNC: 139 MMOL/L (ref 136–145)
TRIGL SERPL-MCNC: 261 MG/DL (ref 0–150)
TSH, 3RD GENERATION: 6.13 UIU/ML (ref 0.27–4.2)
URATE SERPL-MCNC: 4.8 MG/DL (ref 2.5–7.1)
VLDLC SERPL CALC-MCNC: 52 MG/DL (ref 6–23)
WBC # BLD AUTO: 4.4 K/UL (ref 4.3–11.1)

## 2024-07-18 PROBLEM — E55.9 VITAMIN D DEFICIENCY: Status: ACTIVE | Noted: 2024-07-18

## 2024-07-18 PROBLEM — F01.B0 MODERATE VASCULAR DEMENTIA (HCC): Chronic | Status: ACTIVE | Noted: 2023-06-20

## 2024-09-24 ENCOUNTER — OFFICE VISIT (OUTPATIENT)
Dept: INTERNAL MEDICINE CLINIC | Facility: CLINIC | Age: 74
End: 2024-09-24
Payer: MEDICARE

## 2024-09-24 VITALS
SYSTOLIC BLOOD PRESSURE: 136 MMHG | BODY MASS INDEX: 29.77 KG/M2 | DIASTOLIC BLOOD PRESSURE: 82 MMHG | HEIGHT: 63 IN | OXYGEN SATURATION: 99 % | WEIGHT: 168 LBS | HEART RATE: 116 BPM

## 2024-09-24 DIAGNOSIS — E55.9 VITAMIN D DEFICIENCY: ICD-10-CM

## 2024-09-24 DIAGNOSIS — F01.B0 MODERATE VASCULAR DEMENTIA WITHOUT BEHAVIORAL DISTURBANCE, PSYCHOTIC DISTURBANCE, MOOD DISTURBANCE, OR ANXIETY (HCC): ICD-10-CM

## 2024-09-24 DIAGNOSIS — Z12.31 ENCOUNTER FOR SCREENING MAMMOGRAM FOR BREAST CANCER: ICD-10-CM

## 2024-09-24 DIAGNOSIS — I10 ESSENTIAL HYPERTENSION: ICD-10-CM

## 2024-09-24 DIAGNOSIS — E03.9 PRIMARY HYPOTHYROIDISM: ICD-10-CM

## 2024-09-24 DIAGNOSIS — E13.9 LADA (LATENT AUTOIMMUNE DIABETES IN ADULTS), MANAGED AS TYPE 1 (HCC): Chronic | ICD-10-CM

## 2024-09-24 DIAGNOSIS — E78.00 HYPERCHOLESTEROLEMIA: ICD-10-CM

## 2024-09-24 DIAGNOSIS — E10.319 DIABETIC RETINOPATHY OF BOTH EYES ASSOCIATED WITH TYPE 1 DIABETES MELLITUS, MACULAR EDEMA PRESENCE UNSPECIFIED, UNSPECIFIED RETINOPATHY SEVERITY (HCC): ICD-10-CM

## 2024-09-24 DIAGNOSIS — E10.65 TYPE 1 DIABETES MELLITUS WITH HYPERGLYCEMIA (HCC): ICD-10-CM

## 2024-09-24 DIAGNOSIS — Z00.00 MEDICARE ANNUAL WELLNESS VISIT, SUBSEQUENT: Primary | ICD-10-CM

## 2024-09-24 DIAGNOSIS — Z78.0 ASYMPTOMATIC MENOPAUSAL STATE: ICD-10-CM

## 2024-09-24 PROCEDURE — G8427 DOCREV CUR MEDS BY ELIG CLIN: HCPCS | Performed by: NURSE PRACTITIONER

## 2024-09-24 PROCEDURE — G8417 CALC BMI ABV UP PARAM F/U: HCPCS | Performed by: NURSE PRACTITIONER

## 2024-09-24 PROCEDURE — G0439 PPPS, SUBSEQ VISIT: HCPCS | Performed by: NURSE PRACTITIONER

## 2024-09-24 PROCEDURE — 3017F COLORECTAL CA SCREEN DOC REV: CPT | Performed by: NURSE PRACTITIONER

## 2024-09-24 PROCEDURE — 1090F PRES/ABSN URINE INCON ASSESS: CPT | Performed by: NURSE PRACTITIONER

## 2024-09-24 PROCEDURE — 2022F DILAT RTA XM EVC RTNOPTHY: CPT | Performed by: NURSE PRACTITIONER

## 2024-09-24 PROCEDURE — G8400 PT W/DXA NO RESULTS DOC: HCPCS | Performed by: NURSE PRACTITIONER

## 2024-09-24 PROCEDURE — 3046F HEMOGLOBIN A1C LEVEL >9.0%: CPT | Performed by: NURSE PRACTITIONER

## 2024-09-24 PROCEDURE — 99215 OFFICE O/P EST HI 40 MIN: CPT | Performed by: NURSE PRACTITIONER

## 2024-09-24 PROCEDURE — 3079F DIAST BP 80-89 MM HG: CPT | Performed by: NURSE PRACTITIONER

## 2024-09-24 PROCEDURE — 1036F TOBACCO NON-USER: CPT | Performed by: NURSE PRACTITIONER

## 2024-09-24 PROCEDURE — 1123F ACP DISCUSS/DSCN MKR DOCD: CPT | Performed by: NURSE PRACTITIONER

## 2024-09-24 PROCEDURE — 3075F SYST BP GE 130 - 139MM HG: CPT | Performed by: NURSE PRACTITIONER

## 2024-09-24 ASSESSMENT — PATIENT HEALTH QUESTIONNAIRE - PHQ9
SUM OF ALL RESPONSES TO PHQ QUESTIONS 1-9: 0
2. FEELING DOWN, DEPRESSED OR HOPELESS: NOT AT ALL
SUM OF ALL RESPONSES TO PHQ QUESTIONS 1-9: 0
SUM OF ALL RESPONSES TO PHQ9 QUESTIONS 1 & 2: 0
SUM OF ALL RESPONSES TO PHQ QUESTIONS 1-9: 0
1. LITTLE INTEREST OR PLEASURE IN DOING THINGS: NOT AT ALL
SUM OF ALL RESPONSES TO PHQ QUESTIONS 1-9: 0

## 2024-09-24 NOTE — PATIENT INSTRUCTIONS
Meal prep for her meals           Preventing Falls: Care Instructions  Injuries and health problems such as trouble walking or poor eyesight can increase your risk of falling. So can some medicines. But there are things you can do to help prevent falls. You can exercise to get stronger. You can also arrange your home to make it safer.    Talk to your doctor about the medicines you take. Ask if any of them increase the risk of falls and whether they can be changed or stopped.   Try to exercise regularly. It can help improve your strength and balance. This can help lower your risk of falling.         Practice fall safety and prevention.   Wear low-heeled shoes that fit well and give your feet good support. Talk to your doctor if you have foot problems that make this hard.  Carry a cellphone or wear a medical alert device that you can use to call for help.  Use stepladders instead of chairs to reach high objects. Don't climb if you're at risk for falls. Ask for help, if needed.  Wear the correct eyeglasses, if you need them.        Make your home safer.   Remove rugs, cords, clutter, and furniture from walkways.  Keep your house well lit. Use night-lights in hallways and bathrooms.  Install and use sturdy handrails on stairways.  Wear nonskid footwear, even inside. Don't walk barefoot or in socks without shoes.        Be safe outside.   Use handrails, curb cuts, and ramps whenever possible.  Keep your hands free by using a shoulder bag or backpack.  Try to walk in well-lit areas. Watch out for uneven ground, changes in pavement, and debris.  Be careful in the winter. Walk on the grass or gravel when sidewalks are slippery. Use de-icer on steps and walkways. Add non-slip devices to shoes.    Put grab bars and nonskid mats in your shower or tub and near the toilet. Try to use a shower chair or bath bench when bathing.   Get into a tub or shower by putting in your weaker leg first. Get out with your strong side

## 2024-10-03 NOTE — PROGRESS NOTES
[facility], APRN-[facility] Endocrin[facility]  [address], [address]  [place] [address]            Christina Goncalves is seen today for follow up of type 1 diabetes mellitus.      ASSESSMENT AND PLAN:    Interpretation of 72 hour glucose monitor:  At least 72 hours of data were reviewed.  The patient utilizes a Dexcom G7 continuous glucose monitoring system.  The average glucose during the reviewed timeframe was 269 with a coefficient of variation of 35.9% (time in range 21%, time above range 78%, time below range 1%).     1. Type 1 diabetes mellitus with hyperglycemia (HCC)  Assessment & Plan:  A1c 9.7%, GMI: 9.8%. Glycemic control poor with A1c above goal of less than 7.5%. I honestly don't think I'm going to be able to get her much better controlled than she currently is.   Will increase Toujeo dose back to 30 units at night. Continue 36 units in the morning. May consider switching to 70/30 at her next appointment, but they just refilled all of her medications for 90 days.   Recent labs drawn by PCP reviewed with patient.    Orders:  -     AMB POC HEMOGLOBIN A1C  -     TOUJEO MAX SOLOSTAR 300 UNIT/ML SOPN; Inject 36 Units into the skin every morning AND 30 Units nightly. Titrate by 2 units every 3 days to fasting BG goal of  mg/dL. Max Daily Dose: 100 units., Disp-30 mL, R-3, DAWNO PRINT  -     GLUCOSE MONITOR, 72 HOUR, PHYS INTERP  2. Primary hypothyroidism  Assessment & Plan:  Patient was under treated on 125 mcg of generic levothyroxine in July. No dose adjustments were made at that time. Repeat thyroid function tests today. Will make dose adjustments as indicated.    Orders:  -     T4, Free; Future  -     TSH; Future            Return in 6 weeks and 3 months.     History of Present Illness:    Interim medical updates:  reports 36 units at night was dropping her too low so he lowered the evening dose to 8-10 units.     Barriers to care: memory issues. Spouse is primary caregiver

## 2024-10-07 ENCOUNTER — OFFICE VISIT (OUTPATIENT)
Dept: ENDOCRINOLOGY | Age: 74
End: 2024-10-07
Payer: MEDICARE

## 2024-10-07 VITALS
HEART RATE: 103 BPM | OXYGEN SATURATION: 98 % | SYSTOLIC BLOOD PRESSURE: 118 MMHG | DIASTOLIC BLOOD PRESSURE: 68 MMHG | BODY MASS INDEX: 29.51 KG/M2 | WEIGHT: 166.6 LBS

## 2024-10-07 DIAGNOSIS — E03.9 PRIMARY HYPOTHYROIDISM: Chronic | ICD-10-CM

## 2024-10-07 DIAGNOSIS — E10.65 TYPE 1 DIABETES MELLITUS WITH HYPERGLYCEMIA (HCC): Primary | ICD-10-CM

## 2024-10-07 LAB
HBA1C MFR BLD: 9.7 %
T4 FREE SERPL-MCNC: 1.1 NG/DL (ref 0.9–1.7)
TSH, 3RD GENERATION: 6.07 UIU/ML (ref 0.27–4.2)

## 2024-10-07 PROCEDURE — 2022F DILAT RTA XM EVC RTNOPTHY: CPT | Performed by: NURSE PRACTITIONER

## 2024-10-07 PROCEDURE — 95251 CONT GLUC MNTR ANALYSIS I&R: CPT | Performed by: NURSE PRACTITIONER

## 2024-10-07 PROCEDURE — G8417 CALC BMI ABV UP PARAM F/U: HCPCS | Performed by: NURSE PRACTITIONER

## 2024-10-07 PROCEDURE — 83036 HEMOGLOBIN GLYCOSYLATED A1C: CPT | Performed by: NURSE PRACTITIONER

## 2024-10-07 PROCEDURE — 1090F PRES/ABSN URINE INCON ASSESS: CPT | Performed by: NURSE PRACTITIONER

## 2024-10-07 PROCEDURE — 3046F HEMOGLOBIN A1C LEVEL >9.0%: CPT | Performed by: NURSE PRACTITIONER

## 2024-10-07 PROCEDURE — 3078F DIAST BP <80 MM HG: CPT | Performed by: NURSE PRACTITIONER

## 2024-10-07 PROCEDURE — 1036F TOBACCO NON-USER: CPT | Performed by: NURSE PRACTITIONER

## 2024-10-07 PROCEDURE — 99214 OFFICE O/P EST MOD 30 MIN: CPT | Performed by: NURSE PRACTITIONER

## 2024-10-07 PROCEDURE — 3074F SYST BP LT 130 MM HG: CPT | Performed by: NURSE PRACTITIONER

## 2024-10-07 PROCEDURE — G8484 FLU IMMUNIZE NO ADMIN: HCPCS | Performed by: NURSE PRACTITIONER

## 2024-10-07 PROCEDURE — 1123F ACP DISCUSS/DSCN MKR DOCD: CPT | Performed by: NURSE PRACTITIONER

## 2024-10-07 PROCEDURE — 3017F COLORECTAL CA SCREEN DOC REV: CPT | Performed by: NURSE PRACTITIONER

## 2024-10-07 PROCEDURE — G8400 PT W/DXA NO RESULTS DOC: HCPCS | Performed by: NURSE PRACTITIONER

## 2024-10-07 PROCEDURE — G8427 DOCREV CUR MEDS BY ELIG CLIN: HCPCS | Performed by: NURSE PRACTITIONER

## 2024-10-07 RX ORDER — INSULIN GLARGINE 300 U/ML
INJECTION, SOLUTION SUBCUTANEOUS
Qty: 30 ML | Refills: 3
Start: 2024-10-07

## 2024-10-07 ASSESSMENT — ENCOUNTER SYMPTOMS
ABDOMINAL PAIN: 0
CHOKING: 0
NAUSEA: 0
VOMITING: 0
BACK PAIN: 1
DIARRHEA: 0
CHEST TIGHTNESS: 0
BLOOD IN STOOL: 0
CONSTIPATION: 1
COUGH: 1
SHORTNESS OF BREATH: 0

## 2024-10-07 NOTE — PROGRESS NOTES
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  Recommended screening schedule for the next 5-10 years is provided to the patient in written form: see Patient Instructions/AVS.     Return in about 6 months (around 3/24/2025).     Subjective   The following acute and/or chronic problems were also addressed today:  Chronic medical problems and physical exam  (See separate note).    Patient's complete Health Risk Assessment and screening values have been reviewed and are found in Flowsheets. The following problems were reviewed today and where indicated follow up appointments were made and/or referrals ordered.    Positive Risk Factor Screenings with Interventions:    Fall Risk:  Do you feel unsteady or are you worried about falling? : (!) yes (uses cane)  2 or more falls in past year?: no  Fall 
and fever.   Eyes:  Negative for visual disturbance.   Respiratory:  Positive for cough (intermittent). Negative for choking, chest tightness and shortness of breath.    Cardiovascular:  Negative for chest pain, palpitations and leg swelling.   Gastrointestinal:  Positive for constipation. Negative for abdominal pain, blood in stool, diarrhea, nausea and vomiting.   Endocrine: Negative for polydipsia, polyphagia and polyuria.   Genitourinary:  Positive for frequency and pelvic pain.   Musculoskeletal:  Positive for back pain and myalgias. Negative for gait problem.   Skin:  Negative for rash.   Allergic/Immunologic: Positive for environmental allergies.   Neurological:  Positive for weakness, numbness and headaches.   Hematological:  Does not bruise/bleed easily.   Psychiatric/Behavioral:  Positive for dysphoric mood. The patient is nervous/anxious.           OBJECTIVE:  /82 (Site: Right Upper Arm, Position: Sitting, Cuff Size: Large Adult)   Pulse (!) 116   Ht 1.6 m (5' 3\")   Wt 76.2 kg (168 lb)   SpO2 99%   BMI 29.76 kg/m²      Physical Exam  Vitals and nursing note reviewed.   Constitutional:       Appearance: Normal appearance.   HENT:      Head: Normocephalic.      Right Ear: Tympanic membrane, ear canal and external ear normal.      Left Ear: Tympanic membrane, ear canal and external ear normal.      Nose: Rhinorrhea present.      Mouth/Throat:      Mouth: Mucous membranes are moist.   Eyes:      Extraocular Movements: Extraocular movements intact.      Pupils: Pupils are equal, round, and reactive to light.   Cardiovascular:      Rate and Rhythm: Normal rate and regular rhythm.   Pulmonary:      Effort: Pulmonary effort is normal.      Breath sounds: Normal breath sounds.   Abdominal:      General: Abdomen is flat.      Tenderness: There is no abdominal tenderness. There is no right CVA tenderness, left CVA tenderness, guarding or rebound.   Musculoskeletal:      Cervical back: Neck supple.      
Interventions:[id number]}       ADL's:   Patient reports needing help with:  Select all that apply: (!) Laundry, Housekeeping, Banking/Finances, Shopping, Food Preparation, Transportation, Taking Medications  Interventions:  {Medicare AWV HRA Interventions with see above/meds/referrals:[id number]}      {OPTIONAL- LDCT, CVD, STI Counseling Statements:[id number]}            Objective   Vitals:    09/24/24 1003   BP: 136/82   Site: Right Upper Arm   Position: Sitting   Cuff Size: Large Adult   Pulse: (!) 116   SpO2: 99%   Weight: 76.2 kg (168 lb)   Height: 1.6 m (5' 3\")      Body mass index is 29.76 kg/m².        {OPTIONAL - GENERAL PHYSICAL EXAM (WILL AUTO-DELETE IF NOT USED):[id number]}          No Known Allergies  Prior to Visit Medications    Medication Sig Taking? Authorizing Provider   TOUJEO MAX SOLOSTAR 300 UNIT/ML SOPN Inject 36 Units into the skin in the morning and at bedtime Titrate by 2 units every 3 days to fasting BG goal of  mg/dL. Max Daily Dose: 100 units Yes Katie Espinal APRN - CNP   HUMALOG KWIKPEN 100 UNIT/ML SOPN Inject 15 Units into the skin 3 times daily (before meals) Give 25 units with breakfast if watching her eat. Correction scale: 2 unit per 50 points above 150 mg/dL. Max daily dose: 100 units Yes Katie Espinal APRN - CNP   Cholecalciferol (VITAMIN D3) 50 MCG (2000 UT) CAPS Take by mouth Yes Brianna Castellon MD   Multiple Vitamin (TAB-A-ALEXIS PO) Take 1 tablet by mouth daily Yes Brianna Castellon MD GVOKE HYPOPEN 2-PACK 1 MG/0.2ML SOAJ Inject 1 mg into the skin as needed (severe low blood sugar.) Yes Katie Espinal APRN - CNP   levothyroxine (SYNTHROID) 125 MCG tablet Take 1 tablet by mouth daily Yes Miguelangel Denny MD   lisinopril-hydroCHLOROthiazide (PRINZIDE;ZESTORETIC) 20-12.5 MG per tablet Take 1 tablet by mouth daily Yes Miguelangel Denny MD   metoprolol succinate (TOPROL XL) 50 MG extended release tablet Take 1 tablet by mouth daily Yes Miguelangel Denny

## 2024-10-07 NOTE — ASSESSMENT & PLAN NOTE
A1c 9.7%, GMI: 9.8%. Glycemic control poor with A1c above goal of less than 7.5%. I honestly don't think I'm going to be able to get her much better controlled than she currently is.   Will increase Toujeo dose back to 30 units at night. Continue 36 units in the morning. May consider switching to 70/30 at her next appointment, but they just refilled all of her medications for 90 days.   Recent labs drawn by PCP reviewed with patient.

## 2024-10-07 NOTE — ASSESSMENT & PLAN NOTE
Patient was under treated on 125 mcg of generic levothyroxine in July. No dose adjustments were made at that time. Repeat thyroid function tests today. Will make dose adjustments as indicated.

## 2024-10-08 DIAGNOSIS — E03.9 PRIMARY HYPOTHYROIDISM: Primary | ICD-10-CM

## 2024-10-08 RX ORDER — LEVOTHYROXINE SODIUM 137 UG/1
137 TABLET ORAL DAILY
Qty: 90 TABLET | Refills: 3 | Status: SHIPPED | OUTPATIENT
Start: 2024-10-08

## 2025-02-05 ENCOUNTER — TELEPHONE (OUTPATIENT)
Dept: INTERNAL MEDICINE CLINIC | Facility: CLINIC | Age: 75
End: 2025-02-05

## 2025-02-05 NOTE — TELEPHONE ENCOUNTER
Pt is in the hospital at this time. Spoke with  to see who called to get pt a NTP appt, he did not know and informed me that she was in the hospital